# Patient Record
Sex: FEMALE | Race: WHITE | NOT HISPANIC OR LATINO | Employment: OTHER | URBAN - METROPOLITAN AREA
[De-identification: names, ages, dates, MRNs, and addresses within clinical notes are randomized per-mention and may not be internally consistent; named-entity substitution may affect disease eponyms.]

---

## 2017-04-13 ENCOUNTER — HOSPITAL ENCOUNTER (OUTPATIENT)
Facility: HOSPITAL | Age: 82
Setting detail: OBSERVATION
LOS: 1 days | Discharge: HOME | End: 2017-04-15
Attending: EMERGENCY MEDICINE | Admitting: HOSPITALIST

## 2017-04-13 ENCOUNTER — APPOINTMENT (OUTPATIENT)
Dept: CT IMAGING | Facility: HOSPITAL | Age: 82
End: 2017-04-13

## 2017-04-13 ENCOUNTER — APPOINTMENT (OUTPATIENT)
Dept: GENERAL RADIOLOGY | Facility: HOSPITAL | Age: 82
End: 2017-04-13

## 2017-04-13 DIAGNOSIS — S32.000A COMPRESSION FRACTURE OF LUMBAR VERTEBRA, CLOSED, INITIAL ENCOUNTER (HCC): ICD-10-CM

## 2017-04-13 DIAGNOSIS — M54.50 ACUTE MIDLINE LOW BACK PAIN WITHOUT SCIATICA: Primary | ICD-10-CM

## 2017-04-13 DIAGNOSIS — R10.9 ABDOMINAL CRAMPING: ICD-10-CM

## 2017-04-13 LAB
ALBUMIN SERPL-MCNC: 3.9 G/DL (ref 3.5–5.2)
ALBUMIN/GLOB SERPL: 1.1 G/DL
ALP SERPL-CCNC: 75 U/L (ref 39–117)
ALT SERPL W P-5'-P-CCNC: 18 U/L (ref 1–33)
ANION GAP SERPL CALCULATED.3IONS-SCNC: 9.6 MMOL/L
AST SERPL-CCNC: 17 U/L (ref 1–32)
BASOPHILS # BLD AUTO: 0.01 10*3/MM3 (ref 0–0.2)
BASOPHILS NFR BLD AUTO: 0.2 % (ref 0–1.5)
BILIRUB SERPL-MCNC: 0.4 MG/DL (ref 0.1–1.2)
BILIRUB UR QL STRIP: NEGATIVE
BUN BLD-MCNC: 28 MG/DL (ref 8–23)
BUN/CREAT SERPL: 43.8 (ref 7–25)
CALCIUM SPEC-SCNC: 9.9 MG/DL (ref 8.2–9.6)
CHLORIDE SERPL-SCNC: 97 MMOL/L (ref 98–107)
CLARITY UR: CLEAR
CO2 SERPL-SCNC: 30.4 MMOL/L (ref 22–29)
COLOR UR: YELLOW
CREAT BLD-MCNC: 0.64 MG/DL (ref 0.57–1)
DEPRECATED RDW RBC AUTO: 46 FL (ref 37–54)
EOSINOPHIL # BLD AUTO: 0.16 10*3/MM3 (ref 0–0.7)
EOSINOPHIL NFR BLD AUTO: 3.1 % (ref 0.3–6.2)
ERYTHROCYTE [DISTWIDTH] IN BLOOD BY AUTOMATED COUNT: 13.7 % (ref 11.7–13)
GFR SERPL CREATININE-BSD FRML MDRD: 86 ML/MIN/1.73
GLOBULIN UR ELPH-MCNC: 3.7 GM/DL
GLUCOSE BLD-MCNC: 110 MG/DL (ref 65–99)
GLUCOSE UR STRIP-MCNC: NEGATIVE MG/DL
HCT VFR BLD AUTO: 36.2 % (ref 35.6–45.5)
HGB BLD-MCNC: 11.5 G/DL (ref 11.9–15.5)
HGB UR QL STRIP.AUTO: NEGATIVE
IMM GRANULOCYTES # BLD: 0.02 10*3/MM3 (ref 0–0.03)
IMM GRANULOCYTES NFR BLD: 0.4 % (ref 0–0.5)
KETONES UR QL STRIP: NEGATIVE
LEUKOCYTE ESTERASE UR QL STRIP.AUTO: NEGATIVE
LIPASE SERPL-CCNC: 23 U/L (ref 13–60)
LYMPHOCYTES # BLD AUTO: 0.8 10*3/MM3 (ref 0.9–4.8)
LYMPHOCYTES NFR BLD AUTO: 15.6 % (ref 19.6–45.3)
MCH RBC QN AUTO: 29.4 PG (ref 26.9–32)
MCHC RBC AUTO-ENTMCNC: 31.8 G/DL (ref 32.4–36.3)
MCV RBC AUTO: 92.6 FL (ref 80.5–98.2)
MONOCYTES # BLD AUTO: 0.45 10*3/MM3 (ref 0.2–1.2)
MONOCYTES NFR BLD AUTO: 8.8 % (ref 5–12)
NEUTROPHILS # BLD AUTO: 3.69 10*3/MM3 (ref 1.9–8.1)
NEUTROPHILS NFR BLD AUTO: 71.9 % (ref 42.7–76)
NITRITE UR QL STRIP: NEGATIVE
PH UR STRIP.AUTO: 6.5 [PH] (ref 5–8)
PLATELET # BLD AUTO: 154 10*3/MM3 (ref 140–500)
PMV BLD AUTO: 11.6 FL (ref 6–12)
POTASSIUM BLD-SCNC: 5.3 MMOL/L (ref 3.5–5.2)
PROT SERPL-MCNC: 7.6 G/DL (ref 6–8.5)
PROT UR QL STRIP: NEGATIVE
RBC # BLD AUTO: 3.91 10*6/MM3 (ref 3.9–5.2)
SODIUM BLD-SCNC: 137 MMOL/L (ref 136–145)
SP GR UR STRIP: 1.02 (ref 1–1.03)
UROBILINOGEN UR QL STRIP: NORMAL
WBC NRBC COR # BLD: 5.13 10*3/MM3 (ref 4.5–10.7)

## 2017-04-13 PROCEDURE — 74177 CT ABD & PELVIS W/CONTRAST: CPT

## 2017-04-13 PROCEDURE — 99285 EMERGENCY DEPT VISIT HI MDM: CPT

## 2017-04-13 PROCEDURE — 80162 ASSAY OF DIGOXIN TOTAL: CPT | Performed by: EMERGENCY MEDICINE

## 2017-04-13 PROCEDURE — 72110 X-RAY EXAM L-2 SPINE 4/>VWS: CPT

## 2017-04-13 PROCEDURE — 83690 ASSAY OF LIPASE: CPT | Performed by: EMERGENCY MEDICINE

## 2017-04-13 PROCEDURE — 81003 URINALYSIS AUTO W/O SCOPE: CPT | Performed by: EMERGENCY MEDICINE

## 2017-04-13 PROCEDURE — 0 IOPAMIDOL 61 % SOLUTION: Performed by: EMERGENCY MEDICINE

## 2017-04-13 PROCEDURE — 85025 COMPLETE CBC W/AUTO DIFF WBC: CPT | Performed by: EMERGENCY MEDICINE

## 2017-04-13 PROCEDURE — 80053 COMPREHEN METABOLIC PANEL: CPT | Performed by: EMERGENCY MEDICINE

## 2017-04-13 RX ORDER — MORPHINE SULFATE 2 MG/ML
2 INJECTION, SOLUTION INTRAMUSCULAR; INTRAVENOUS ONCE
Status: COMPLETED | OUTPATIENT
Start: 2017-04-13 | End: 2017-04-14

## 2017-04-13 RX ORDER — ONDANSETRON 2 MG/ML
4 INJECTION INTRAMUSCULAR; INTRAVENOUS ONCE
Status: COMPLETED | OUTPATIENT
Start: 2017-04-13 | End: 2017-04-14

## 2017-04-13 RX ORDER — SODIUM CHLORIDE 0.9 % (FLUSH) 0.9 %
10 SYRINGE (ML) INJECTION AS NEEDED
Status: DISCONTINUED | OUTPATIENT
Start: 2017-04-13 | End: 2017-04-15 | Stop reason: HOSPADM

## 2017-04-13 RX ADMIN — IOPAMIDOL 85 ML: 612 INJECTION, SOLUTION INTRAVENOUS at 23:47

## 2017-04-14 PROBLEM — K52.9 ENTERITIS: Status: ACTIVE | Noted: 2017-04-14

## 2017-04-14 PROBLEM — S32.000A COMPRESSION FRACTURE OF LUMBAR VERTEBRA (HCC): Status: ACTIVE | Noted: 2017-04-14

## 2017-04-14 PROBLEM — M54.50 ACUTE MIDLINE LOW BACK PAIN WITHOUT SCIATICA: Status: ACTIVE | Noted: 2017-04-14

## 2017-04-14 LAB
DIGOXIN SERPL-MCNC: 0.5 NG/ML (ref 0.6–1.2)
POTASSIUM BLD-SCNC: 4.3 MMOL/L (ref 3.5–5.2)

## 2017-04-14 PROCEDURE — G8978 MOBILITY CURRENT STATUS: HCPCS

## 2017-04-14 PROCEDURE — G0378 HOSPITAL OBSERVATION PER HR: HCPCS

## 2017-04-14 PROCEDURE — 96375 TX/PRO/DX INJ NEW DRUG ADDON: CPT

## 2017-04-14 PROCEDURE — 25010000002 MORPHINE PER 10 MG: Performed by: EMERGENCY MEDICINE

## 2017-04-14 PROCEDURE — G8979 MOBILITY GOAL STATUS: HCPCS

## 2017-04-14 PROCEDURE — 84132 ASSAY OF SERUM POTASSIUM: CPT | Performed by: INTERNAL MEDICINE

## 2017-04-14 PROCEDURE — 96374 THER/PROPH/DIAG INJ IV PUSH: CPT

## 2017-04-14 PROCEDURE — G8980 MOBILITY D/C STATUS: HCPCS

## 2017-04-14 PROCEDURE — 97161 PT EVAL LOW COMPLEX 20 MIN: CPT

## 2017-04-14 PROCEDURE — 25010000002 ONDANSETRON PER 1 MG: Performed by: EMERGENCY MEDICINE

## 2017-04-14 RX ORDER — MAGNESIUM HYDROXIDE/ALUMINUM HYDROXICE/SIMETHICONE 120; 1200; 1200 MG/30ML; MG/30ML; MG/30ML
30 SUSPENSION ORAL DAILY PRN
Status: DISCONTINUED | OUTPATIENT
Start: 2017-04-14 | End: 2017-04-15 | Stop reason: HOSPADM

## 2017-04-14 RX ORDER — POTASSIUM CHLORIDE 750 MG/1
20 CAPSULE, EXTENDED RELEASE ORAL DAILY
Status: DISCONTINUED | OUTPATIENT
Start: 2017-04-14 | End: 2017-04-15 | Stop reason: HOSPADM

## 2017-04-14 RX ORDER — LIDOCAINE 50 MG/G
1 PATCH TOPICAL
Status: DISCONTINUED | OUTPATIENT
Start: 2017-04-14 | End: 2017-04-15 | Stop reason: HOSPADM

## 2017-04-14 RX ORDER — ISOSORBIDE MONONITRATE 30 MG/1
30 TABLET, EXTENDED RELEASE ORAL DAILY
Status: DISCONTINUED | OUTPATIENT
Start: 2017-04-14 | End: 2017-04-15 | Stop reason: HOSPADM

## 2017-04-14 RX ORDER — SODIUM CHLORIDE 0.9 % (FLUSH) 0.9 %
1-10 SYRINGE (ML) INJECTION AS NEEDED
Status: DISCONTINUED | OUTPATIENT
Start: 2017-04-14 | End: 2017-04-15 | Stop reason: HOSPADM

## 2017-04-14 RX ORDER — CHOLECALCIFEROL (VITAMIN D3) 125 MCG
3000 CAPSULE ORAL
COMMUNITY
End: 2018-01-01 | Stop reason: SDUPTHER

## 2017-04-14 RX ORDER — PANTOPRAZOLE SODIUM 40 MG/1
40 TABLET, DELAYED RELEASE ORAL
Status: DISCONTINUED | OUTPATIENT
Start: 2017-04-14 | End: 2017-04-15 | Stop reason: HOSPADM

## 2017-04-14 RX ORDER — LISINOPRIL 20 MG/1
20 TABLET ORAL DAILY
Status: DISCONTINUED | OUTPATIENT
Start: 2017-04-14 | End: 2017-04-15 | Stop reason: HOSPADM

## 2017-04-14 RX ORDER — TRAMADOL HYDROCHLORIDE 50 MG/1
50 TABLET ORAL EVERY 6 HOURS PRN
Status: DISCONTINUED | OUTPATIENT
Start: 2017-04-14 | End: 2017-04-15 | Stop reason: HOSPADM

## 2017-04-14 RX ORDER — FERROUS SULFATE 325(65) MG
325 TABLET ORAL
Status: DISCONTINUED | OUTPATIENT
Start: 2017-04-14 | End: 2017-04-15 | Stop reason: HOSPADM

## 2017-04-14 RX ORDER — ONDANSETRON 4 MG/1
4 TABLET, ORALLY DISINTEGRATING ORAL EVERY 6 HOURS PRN
Status: DISCONTINUED | OUTPATIENT
Start: 2017-04-14 | End: 2017-04-15 | Stop reason: HOSPADM

## 2017-04-14 RX ORDER — POLYETHYLENE GLYCOL 3350 17 G/17G
17 POWDER, FOR SOLUTION ORAL DAILY
Status: DISCONTINUED | OUTPATIENT
Start: 2017-04-14 | End: 2017-04-15 | Stop reason: HOSPADM

## 2017-04-14 RX ORDER — AMLODIPINE BESYLATE 2.5 MG/1
2.5 TABLET ORAL DAILY
Status: DISCONTINUED | OUTPATIENT
Start: 2017-04-14 | End: 2017-04-15 | Stop reason: HOSPADM

## 2017-04-14 RX ORDER — ONDANSETRON 4 MG/1
4 TABLET, FILM COATED ORAL EVERY 6 HOURS PRN
Status: DISCONTINUED | OUTPATIENT
Start: 2017-04-14 | End: 2017-04-15 | Stop reason: HOSPADM

## 2017-04-14 RX ORDER — TRAZODONE HYDROCHLORIDE 50 MG/1
50 TABLET ORAL NIGHTLY PRN
Status: DISCONTINUED | OUTPATIENT
Start: 2017-04-14 | End: 2017-04-15 | Stop reason: HOSPADM

## 2017-04-14 RX ORDER — ONDANSETRON 2 MG/ML
4 INJECTION INTRAMUSCULAR; INTRAVENOUS EVERY 6 HOURS PRN
Status: DISCONTINUED | OUTPATIENT
Start: 2017-04-14 | End: 2017-04-15 | Stop reason: HOSPADM

## 2017-04-14 RX ORDER — BISACODYL 10 MG
10 SUPPOSITORY, RECTAL RECTAL AS NEEDED
Status: DISCONTINUED | OUTPATIENT
Start: 2017-04-14 | End: 2017-04-15 | Stop reason: HOSPADM

## 2017-04-14 RX ORDER — ACETAMINOPHEN 325 MG/1
650 TABLET ORAL EVERY 6 HOURS PRN
Status: DISCONTINUED | OUTPATIENT
Start: 2017-04-14 | End: 2017-04-15 | Stop reason: HOSPADM

## 2017-04-14 RX ORDER — OXYCODONE HYDROCHLORIDE AND ACETAMINOPHEN 5; 325 MG/1; MG/1
1 TABLET ORAL EVERY 4 HOURS PRN
Status: DISCONTINUED | OUTPATIENT
Start: 2017-04-14 | End: 2017-04-15 | Stop reason: HOSPADM

## 2017-04-14 RX ORDER — CHOLECALCIFEROL (VITAMIN D3) 125 MCG
3000 CAPSULE ORAL
Status: DISCONTINUED | OUTPATIENT
Start: 2017-04-14 | End: 2017-04-15 | Stop reason: HOSPADM

## 2017-04-14 RX ORDER — SERTRALINE HYDROCHLORIDE 25 MG/1
25 TABLET, FILM COATED ORAL DAILY
Status: DISCONTINUED | OUTPATIENT
Start: 2017-04-14 | End: 2017-04-15 | Stop reason: HOSPADM

## 2017-04-14 RX ADMIN — FERROUS SULFATE TAB 325 MG (65 MG ELEMENTAL FE) 325 MG: 325 (65 FE) TAB at 10:59

## 2017-04-14 RX ADMIN — ISOSORBIDE MONONITRATE 30 MG: 30 TABLET ORAL at 10:59

## 2017-04-14 RX ADMIN — PANTOPRAZOLE SODIUM 40 MG: 40 TABLET, DELAYED RELEASE ORAL at 11:07

## 2017-04-14 RX ADMIN — RIVAROXABAN 15 MG: 15 TABLET, FILM COATED ORAL at 10:59

## 2017-04-14 RX ADMIN — SERTRALINE 25 MG: 25 TABLET, FILM COATED ORAL at 10:59

## 2017-04-14 RX ADMIN — LACTASE TAB 3000 UNIT 3000 UNITS: 3000 TAB at 16:12

## 2017-04-14 RX ADMIN — LISINOPRIL 20 MG: 20 TABLET ORAL at 10:59

## 2017-04-14 RX ADMIN — TRAMADOL HYDROCHLORIDE 50 MG: 50 TABLET, COATED ORAL at 13:07

## 2017-04-14 RX ADMIN — MORPHINE SULFATE 2 MG: 2 INJECTION, SOLUTION INTRAMUSCULAR; INTRAVENOUS at 00:27

## 2017-04-14 RX ADMIN — LACTASE TAB 3000 UNIT 3000 UNITS: 3000 TAB at 11:01

## 2017-04-14 RX ADMIN — POTASSIUM CHLORIDE 20 MEQ: 750 CAPSULE, EXTENDED RELEASE ORAL at 11:07

## 2017-04-14 RX ADMIN — OXYCODONE HYDROCHLORIDE AND ACETAMINOPHEN 1 TABLET: 5; 325 TABLET ORAL at 20:44

## 2017-04-14 RX ADMIN — ONDANSETRON 4 MG: 2 INJECTION INTRAMUSCULAR; INTRAVENOUS at 00:27

## 2017-04-14 RX ADMIN — LIDOCAINE 1 PATCH: 50 PATCH CUTANEOUS at 10:59

## 2017-04-14 RX ADMIN — AMLODIPINE BESYLATE 2.5 MG: 2.5 TABLET ORAL at 10:59

## 2017-04-14 NOTE — PLAN OF CARE
Problem: Patient Care Overview (Adult)  Goal: Plan of Care Review    04/14/17 2247   Coping/Psychosocial Response Interventions   Plan Of Care Reviewed With patient   Outcome Evaluation   Outcome Summary/Follow up Plan Pt reports near baseline status. Ambulated 200' using rolling walker w/ stand by assist. No inpatient PT needs, recommend return to assisted living w/ home PT. DC PT, pt aware of activity recommendations while inpatient.

## 2017-04-14 NOTE — THERAPY DISCHARGE NOTE
Acute Care - Physical Therapy Initial Eval/Discharge  [facility]     Patient Name: Isadora Powell  : 1921  MRN: [id number]  Today's Date: 2017   Onset of Illness/Injury or Date of Surgery Date: 17            Admit Date: 2017    Visit Dx:    ICD-10-CM ICD-9-CM   1. Acute midline low back pain without sciatica M54.5 724.2   2. Compression fracture of lumbar vertebra, closed, initial encounter S32.000A 805.4   3. Abdominal cramping R10.9 789.00     Patient Active Problem List   Diagnosis   • HTN (hypertension)   • CVA (cerebral infarction)   • GERD (gastroesophageal reflux disease)   • GERD (gastroesophageal reflux disease)   • Atrial fibrillation   • Depression   • Acute midline low back pain without sciatica   • Enteritis   • Compression fracture of lumbar vertebra     Past Medical History:   Diagnosis Date   • Atrial fibrillation    • CVA (cerebral infarction)    • Depression    • GERD (gastroesophageal reflux disease)    • Hypertension    • Hyponatremia    • Skin cancer    • TIA (transient ischemic attack)      Past Surgical History:   Procedure Laterality Date   • CATARACT EXTRACTION, BILATERAL  unknown   • CHOLECYSTECTOMY  unknown          PT ASSESSMENT (last 72 hours)      PT Evaluation       17 1340 17 0531    Rehab Evaluation    Document Type evaluation  -AR     Subjective Information agree to therapy  -AR     Patient Effort, Rehab Treatment good  -AR     Symptoms Noted During/After Treatment none  -AR     General Information    Patient Profile Review yes  -AR     Onset of Illness/Injury or Date of Surgery Date 17  -AR     Precautions/Limitations fall precautions  -AR     Prior Level of Function min assist:   assisted living, walks to dining rainey  -AR     Equipment Currently Used at Home --   rollator  -AR     Barriers to Rehab none identified  -AR     Living Environment    Lives With  facility resident  -DA    Living Arrangements  extended care facility    [facility]  -DA    Home Accessibility  other (see comments)   LTC facility accommodations  -DA    Stair Railings at Home  inside, present at both sides;outside, present at both sides  -DA    Type of Financial/Environmental Concern  none  -DA    Transportation Available  car  -DA    Living Environment Comment assisted facility  -AR     Clinical Impression    Patient/Family Goals Statement DC back to assisted living  -AR     Criteria for Skilled Therapeutic Interventions Met no  -AR     Pain Assessment    Pain Assessment 0-10  -AR     Pain Score 4  -AR     Pain Type Surgical pain  -AR     Pain Location Back  -AR     Pain Intervention(s) Repositioned  -AR     Cognitive Assessment/Intervention    Current Cognitive/Communication Assessment functional  -AR     Orientation Status oriented x 4  -AR     Follows Commands/Answers Questions 100% of the time  -AR     ROM (Range of Motion)    General ROM --   B LE WFL  -AR     MMT (Manual Muscle Testing)    General MMT Assessment --   BLE 4/5  -AR     Bed Mobility, Assessment/Treatment    Bed Mob, Supine to Sit, [place] not tested  -AR     Bed Mob, Sit to Supine, [place] not tested  -AR     Transfer Assessment/Treatment    Transfers, Sit-Stand [place] stand by assist  -AR     Transfers, Stand-Sit [place] stand by assist  -AR     Transfers, Sit-Stand-Sit, Assist Device rolling walker  -AR     Gait Assessment/Treatment    Gait, [place] Level stand by assist  -AR     Gait, Assistive Device rolling walker  -AR     Gait, Distance (Feet) 200  -AR     Gait, Gait Pattern Analysis swing-through gait  -AR     Gait, Gait Deviations loretta decreased;decreased heel strike  -AR     Gait, Safety Issues step length decreased  -AR     Gait, Comment pt reports at baseline status  -AR     Positioning and Restraints    Pre-Treatment Position sitting in chair/recliner  -AR     Post Treatment Position chair  -AR     In Chair sitting;encouraged to call for assist;call  light within reach  -AR       04/14/17 0529       General Information    Equipment Currently Used at Home walker, rolling  -DA       User Key  (r) = Recorded By, (t) = Taken By, (c) = Cosigned By    Initials Name Provider Type    FOSTER Callahan, RN Registered Nurse    TYLER Clark, PT Physical Therapist              PT Recommendation and Plan  Anticipated Discharge Disposition: assisted living, home with home health  PT Frequency: evaluation only  Plan of Care Review  Plan Of Care Reviewed With: patient  Outcome Summary/Follow up Plan: Pt reports near baseline status.  Ambulated 200' using rolling walker w/ stand by assist.  No inpatient PT needs, recommend return to assisted living w/ home PT.  DC PT, pt aware of activity recommendations while inpatient.               Outcome Measures       04/14/17 1300          How much help from another person do you currently need...    Turning from your back to your side while in flat bed without using bedrails? 4  -AR      Moving from lying on back to sitting on the side of a flat bed without bedrails? 4  -AR      Moving to and from a bed to a chair (including a wheelchair)? 4  -AR      Standing up from a chair using your arms (e.g., wheelchair, bedside chair)? 4  -AR      Climbing 3-5 steps with a railing? 3  -AR      To walk in hospital room? 3  -AR      AM-PAC 6 Clicks Score 22  -AR      Functional Assessment    Outcome Measure Options AM-PAC 6 Clicks Basic Mobility (PT)  -AR        User Key  (r) = Recorded By, (t) = Taken By, (c) = Cosigned By    Initials Name Provider Type    TYLER Clark PT Physical Therapist           Time Calculation:         PT Charges       04/14/17 1350          Time Calculation    Start Time 1323  -AR      Stop Time 1350  -AR      Time Calculation (min) 27 min  -AR      PT Received On 04/14/17  -AR        User Key  (r) = Recorded By, (t) = Taken By, (c) = Cosigned By    Initials Name Provider Type    TYLER Clark PT  Physical Therapist          Therapy Charges for Today     Code Description Service Date Service Provider Modifiers Qty    75828051418 HC PT EVAL LOW COMPLEXITY 2 4/14/2017 Corinne Clark, PT GP 1    58831093885 HC PT MOBILITY CURRENT 4/14/2017 Corinne Clark PT GP, CI 1    51383614840 HC PT MOBILITY PROJECTED 4/14/2017 Corinne Clark PT GP, CI 1    99229951560 HC PT MOBILITY DISCHARGE 4/14/2017 Corinne Clark PT GP, CI 1          PT G-Codes  Outcome Measure Options: AM-PAC 6 Clicks Basic Mobility (PT)  Functional Limitation: Mobility: Walking and moving around  Mobility: Walking and Moving Around Current Status (): At least 1 percent but less than 20 percent impaired, limited or restricted  Mobility: Walking and Moving Around Goal Status (): At least 1 percent but less than 20 percent impaired, limited or restricted  Mobility: Walking and Moving Around Discharge Status (): At least 1 percent but less than 20 percent impaired, limited or restricted    PT Discharge Summary  Anticipated Discharge Disposition: assisted living, home with home health  Reason for Discharge: At baseline function  Discharge Destination: Assisted Living Facility    Corinne Clark PT  4/14/2017

## 2017-04-14 NOTE — H&P
[facility] Admission H&P    Patient Care Team:  JANETTE Kaur as PCP - General (Internal Medicine)    Chief complaint: Upper abdominal cramping, low back pain after a fall one week ago    History of Present Illness    This is a 89-year-old white female with a history of atrial fibrillation, prior stroke, hypertension, GERD, and chronic lumbar compression fractures that presented to the hospital last evening with complaints of upper abdominal cramping and nausea but no vomiting.  It was also discovered in the emergency room that she was having low back pain.  She experienced a fall one week ago.  She initially did not have pain after its onset a few days ago has become slightly worse.  It is in the exact location of her previous compression fractures.  Today she was able to ambulate to the bathroom with assistance and did not have any pain.  She only has pain when you press on her low back.  Her nausea has improved.  She has been able to eat breakfast this morning.  She denies any diarrhea, fevers, chills.  No chest pain.  She feels a little short of breath at times but this appears to be transient in nature.    Past Medical History:   Diagnosis Date   • Atrial fibrillation    • CVA (cerebral infarction)    • Depression    • GERD (gastroesophageal reflux disease)    • Hypertension    • Hyponatremia    • Skin cancer    • TIA (transient ischemic attack)      Past Surgical History:   Procedure Laterality Date   • CATARACT EXTRACTION, BILATERAL  unknown   • CHOLECYSTECTOMY  unknown     History reviewed. No pertinent family history.  Social History   Substance Use Topics   • Smoking status: Former Smoker     Packs/day: 0.25     Years: 15.00     Types: Cigarettes     Start date: 1940     Quit date: 1955   • Smokeless tobacco: Never Used   • Alcohol use No     Prescriptions Prior to Admission   Medication Sig Dispense Refill Last Dose   • acetaminophen (TYLENOL) 325 MG tablet Take 650 mg by mouth Every 6 (Six) Hours  As Needed for mild pain (1-3).   Not Taking   • aluminum-magnesium hydroxide-simethicone (MAALOX/MYLANTA) 200-200-20 MG/5ML suspension Take 30 mL by mouth daily as needed for indigestion or heartburn.   Taking   • amLODIPine (NORVASC) 2.5 MG tablet Take 2.5 mg by mouth daily.   Taking   • bisacodyl (BISACODYL LAXATIVE) 10 MG suppository Insert 10 mg into the rectum As Needed for constipation.      • Cetirizine HCl (ZYRTEC ALLERGY) 10 MG capsule Take  by mouth daily.   Taking   • Cholecalciferol (VITAMIN D-3) 1000 UNITS capsule Take 1,000 Units by mouth Daily.   Taking   • digoxin (LANOXIN) 125 MCG tablet Take 125 mcg by mouth Every Other Day. At 11 am, Hold for heart rate < 60   Taking   • ferrous sulfate 325 (65 FE) MG tablet Take 325 mg by mouth daily with breakfast.   Taking   • isosorbide mononitrate (IMDUR) 30 MG 24 hr tablet Take 30 mg by mouth Daily.      • lactase (LACTAID) 3000 UNITS tablet Take 3,000 Units by mouth 3 (Three) Times a Day With Meals.      • lisinopril (PRINIVIL,ZESTRIL) 20 MG tablet Take 20 mg by mouth daily.   Taking   • Loperamide HCl (IMODIUM A-D PO) Take 2 mg by mouth 2 (Two) Times a Day.      • Multiple Vitamins-Minerals (CERTAVITE SENIOR/ANTIOXIDANT PO) Take  by mouth Daily.      • nitroglycerin (NITROSTAT) 0.4 MG SL tablet Place 0.4 mg under the tongue Every 5 (Five) Minutes As Needed for chest pain. Take no more than 3 doses in 15 minutes.      • omeprazole (priLOSEC) 20 MG capsule Take 20 mg by mouth Daily.      • ondansetron (ZOFRAN) 4 MG tablet Take 4 mg by mouth Every 8 (Eight) Hours As Needed for nausea or vomiting.      • oyster shell calcium 500 MG tablet tablet Take  by mouth 2 (two) times a day.   Taking   • polyethylene glycol (MIRALAX) packet Take 17 g by mouth Daily.   Taking   • potassium chloride (K-DUR,KLOR-CON) 20 MEQ CR tablet Take 20 mEq by mouth 2 (Two) Times a Day.      • rivaroxaban (XARELTO) 15 MG tablet Take 15 mg by mouth daily.   Taking   • sertraline  (ZOLOFT) 25 MG tablet Take 25 mg by mouth daily.   Taking   • traMADol (ULTRAM) 50 MG tablet Take 50 mg by mouth Every 6 (Six) Hours As Needed for moderate pain (4-6).   Taking   • traZODone (DESYREL) 50 MG tablet Take 50 mg by mouth At Night As Needed for sleep.        Allergies:  Miconazole and Monistat [tioconazole]    Review of Systems   Constitutional: Negative for chills and fever.   HENT: Negative for congestion and sore throat.    Eyes: Negative for visual disturbance.   Respiratory: Negative for cough, chest tightness, shortness of breath and wheezing.    Cardiovascular: Negative for chest pain, palpitations and leg swelling.   Gastrointestinal: Positive for abdominal pain and nausea. Negative for abdominal distention, diarrhea and vomiting.   Endocrine: Negative for polydipsia and polyuria.   Genitourinary: Negative for difficulty urinating, dysuria, frequency and urgency.   Musculoskeletal: Positive for back pain. Negative for arthralgias and myalgias.   Skin: Negative for color change and rash.   Neurological: Positive for weakness. Negative for dizziness and light-headedness.        PHYSICAL EXAM    Vital Signs  tMax 24 hrs:  Temp (24hrs), Av.6 °F (36.4 °C), Min:97.5 °F (36.4 °C), Max:97.8 °F (36.6 °C)    Vitals Ranges:  Temp:  [97.5 °F (36.4 °C)-97.8 °F (36.6 °C)] 97.6 °F (36.4 °C)  Heart Rate:  [59-75] 75  Resp:  [12-22] 12  BP: (120-157)/(54-97) 132/65    Physical Exam   Constitutional: She is oriented to person, place, and time. She appears well-developed and well-nourished. No distress.   Frail, elderly appearing white female.  Appears her stated age.   HENT:   Head: Normocephalic and atraumatic.   Eyes: EOM are normal. Pupils are equal, round, and reactive to light.   Neck: Neck supple. No tracheal deviation present.   Cardiovascular: Normal rate.  Exam reveals no gallop.    No murmur heard.  Pulmonary/Chest: Effort normal. No respiratory distress. She has no wheezes.   Abdominal: Soft. Bowel  sounds are normal. She exhibits no distension. There is no tenderness.   Musculoskeletal: She exhibits tenderness. She exhibits no edema.   She does have mild to moderate point tenderness over the lumbar spine.  Negative straight leg raise bilaterally.   Neurological: She is alert and oriented to person, place, and time. No cranial nerve deficit.   Skin: Skin is warm and dry.   Nursing note and vitals reviewed.      Results Review:    Results from last 7 days  Lab Units 04/13/17  2233   WBC 10*3/mm3 5.13   HEMOGLOBIN g/dL 11.5*   HEMATOCRIT % 36.2   PLATELETS 10*3/mm3 154       Results from last 7 days  Lab Units 04/13/17  2233   SODIUM mmol/L 137   POTASSIUM mmol/L 5.3*   CHLORIDE mmol/L 97*   TOTAL CO2 mmol/L 30.4*   BUN mg/dL 28*   CREATININE mg/dL 0.64   CALCIUM mg/dL 9.9*   BILIRUBIN mg/dL 0.4   ALK PHOS U/L 75   ALT (SGPT) U/L 18   AST (SGOT) U/L 17   GLUCOSE mg/dL 110*     CT scan of the abdomen and pelvis:  1.  Enteritis/enterocolitis is suspected, no obstruction, perforation or  abscess.   2.  Hepatomegaly measuring 21.1 cm. Hepatic cysts measuring up to 2.2  cm.  3.  Cardiomegaly and severe atherosclerotic disease.    Lumbar spine x-ray:  Interval development of chronic osteoporotic compression fractures,  osteoporotic changes are present. In the midst of these extensive  findings, a subacute fracture cannot be excluded. In case of focal  tenderness MRI may be performed.     I reviewed the patient's new clinical results.  I reviewed the patient's new imaging results and agree with the interpretation.      Principal Problem:    Acute midline low back pain without sciatica  Active Problems:    HTN (hypertension)    GERD (gastroesophageal reflux disease)    Atrial fibrillation    Enteritis    Compression fracture of lumbar vertebra   Mild hyperkalemia    Assessment & Plan    The patient has been admitted for her low back pain as well as her upper abdominal pain.  CT scan of the abdomen and pelvis indicates  that she likely has an enteritis.  We'll treat her with supportive care for this.  She has a history of chronic compression fractures of her lumbar spine that occurred 20 years ago.  She had a fall one week ago and has had pain in this area once again over the past few days.  Today she was able to ambulate with assistance to the bathroom and states she had no pain with that.  She is mildly point tender over her lumbar spine.  I discussed with her what our options would be including MRI and neurosurgery evaluation versus conservative management with pain medications, Lidoderm patch, and physical therapy.  She has opted for the latter.  She states she would not want to undergo an MRI or any procedure.  She states that when she was taken off of her Xarelto in the past for a prior procedure she had a stroke within a couple of days.  Given that she was able to ambulate this morning without significant pain I think a conservative plan is best for this 89-year-old lady.  I will ask PT to evaluate her.  Will ask care coordination to see her for help with discharge planning.  I will recheck a potassium given mild elevation on presentation.  We'll see how she progresses with further plans based on her clinical course.    I discussed the patients findings and my recommendations with patient and nursing staff    Rolando Gorman MD  04/14/17  8:49 AM

## 2017-04-14 NOTE — PLAN OF CARE
Problem: Patient Care Overview (Adult)  Goal: Plan of Care Review  Outcome: Ongoing (interventions implemented as appropriate)    04/14/17 1951   Coping/Psychosocial Response Interventions   Plan Of Care Reviewed With patient   Patient Care Overview   Progress improving   Outcome Evaluation   Outcome Summary/Follow up Plan Pt states her pain is improved. Ultram given x1 with relief. PT worked with pt, they say she is at baseline and recommend she return to assisted living and signed off. Will monitor.         Problem: Fall Risk (Adult)  Goal: Absence of Falls  Outcome: Ongoing (interventions implemented as appropriate)    Problem: Pain, Acute (Adult)  Goal: Identify Related Risk Factors and Signs and Symptoms  Outcome: Outcome(s) achieved Date Met:  04/14/17  Goal: Acceptable Pain Control/Comfort Level  Outcome: Ongoing (interventions implemented as appropriate)

## 2017-04-14 NOTE — PLAN OF CARE
Problem: Patient Care Overview (Adult)  Goal: Plan of Care Review  Outcome: Ongoing (interventions implemented as appropriate)    04/14/17 0552   Coping/Psychosocial Response Interventions   Plan Of Care Reviewed With patient   Patient Care Overview   Progress no change   Outcome Evaluation   Outcome Summary/Follow up Plan pt admitted to unit from ER, pt welcomed and oriented to unit, questions/comments/concerns addressed. Pt denies c/o pain/discomfort at time of admission. Pt requested O2 per NC be taken off, pt sats 95% and no O2 at home, pt currently on RA. MD notified of admit for orders. Continue to monitor and tx per POC and MD orders       Goal: Adult Individualization and Mutuality  Outcome: Ongoing (interventions implemented as appropriate)  Goal: Discharge Needs Assessment  Outcome: Ongoing (interventions implemented as appropriate)    Problem: Fall Risk (Adult)  Goal: Identify Related Risk Factors and Signs and Symptoms  Outcome: Outcome(s) achieved Date Met:  04/14/17  Goal: Absence of Falls  Outcome: Ongoing (interventions implemented as appropriate)    Problem: Pain, Acute (Adult)  Goal: Identify Related Risk Factors and Signs and Symptoms  Outcome: Ongoing (interventions implemented as appropriate)  Goal: Acceptable Pain Control/Comfort Level  Outcome: Ongoing (interventions implemented as appropriate)

## 2017-04-14 NOTE — ED PROVIDER NOTES
" EMERGENCY DEPARTMENT ENCOUNTER    CHIEF COMPLAINT  Chief Complaint: back pain, abd pain  History given by: patient  History limited by: nothing  Room Number: 22/22  PMD: JANETTE Kaur      HPI:  Pt is a 89 y.o. female who presents complaining of upper abd \"spasms\" and lumbar back pain, which began earlier tonight. Pt states that her pain is worse with ambulation and movement. Pt states that she fell one week ago but did not have any pain initially. Pt states that she has known lumbar compression fractures. Pt also complains of diarrhea one week ago (now resolved) but denies N/V, numbness or tingling. Pt states that she has been able to ambulate with a rollator since that time. Pt is on Xarelto for a-fib.     Duration:  One day  Onset: gradual  Timing: constant  Location: lumbar spine  Radiation: none  Quality: \"pain\"  Intensity/Severity: moderate  Progression: worsening  Associated Symptoms: upper abd \"spasms\", diarrhea one week ago  Aggravating Factors: ambulation, movement  Alleviating Factors: none  Previous Episodes: Pt states that she has known L-Spine compression fractures.  Treatment before arrival: none    PAST MEDICAL HISTORY  Active Ambulatory Problems     Diagnosis Date Noted   • Uncontrolled hypertension 05/27/2016   • CVA (cerebral infarction) 05/28/2016   • GERD (gastroesophageal reflux disease) 05/28/2016   • GERD (gastroesophageal reflux disease) 05/28/2016   • Atrial fibrillation 05/28/2016   • Depression 05/28/2016     Resolved Ambulatory Problems     Diagnosis Date Noted   • No Resolved Ambulatory Problems     Past Medical History:   Diagnosis Date   • Atrial fibrillation    • CVA (cerebral infarction)    • Depression    • GERD (gastroesophageal reflux disease)    • Hypertension    • Hyponatremia    • Skin cancer    • TIA (transient ischemic attack)        PAST SURGICAL HISTORY  Past Surgical History:   Procedure Laterality Date   • CATARACT EXTRACTION, BILATERAL  unknown   • " CHOLECYSTECTOMY  unknown       FAMILY HISTORY  History reviewed. No pertinent family history.    SOCIAL HISTORY  Social History     Social History   • Marital status:      Spouse name: N/A   • Number of children: N/A   • Years of education: N/A     Occupational History   • Not on file.     Social History Main Topics   • Smoking status: Former Smoker     Packs/day: 0.25     Years: 15.00     Types: Cigarettes     Start date: 1940     Quit date: 1955   • Smokeless tobacco: Not on file   • Alcohol use No   • Drug use: No   • Sexual activity: Defer     Other Topics Concern   • Not on file     Social History Narrative       ALLERGIES  Miconazole and Monistat [tioconazole]    REVIEW OF SYSTEMS  Review of Systems   Constitutional: Negative for fever.   HENT: Negative for sore throat.    Eyes: Negative.    Respiratory: Negative for cough and shortness of breath.    Cardiovascular: Negative for chest pain.   Gastrointestinal: Positive for abdominal pain (upper) and diarrhea (one week ago, now resolved). Negative for vomiting.   Genitourinary: Negative for dysuria.   Musculoskeletal: Positive for back pain (lumbar). Negative for neck pain.   Skin: Negative for rash.   Allergic/Immunologic: Negative.    Neurological: Negative for weakness, numbness and headaches.   Hematological: Negative.    Psychiatric/Behavioral: Negative.    All other systems reviewed and are negative.      PHYSICAL EXAM  ED Triage Vitals   Temp Heart Rate Resp BP SpO2   04/13/17 2142 04/13/17 2142 04/13/17 2142 04/13/17 2142 04/13/17 2142   97.5 °F (36.4 °C) 70 20 140/75 99 %      Temp src Heart Rate Source Patient Position BP Location FiO2 (%)   -- -- -- -- --              Physical Exam   Constitutional: She is oriented to person, place, and time and well-developed, well-nourished, and in no distress. No distress.   HENT:   Head: Normocephalic and atraumatic.   Eyes: EOM are normal. Pupils are equal, round, and reactive to light.   Neck: Normal  range of motion. Neck supple.   Cardiovascular: Normal rate, regular rhythm and normal heart sounds.    Pulmonary/Chest: Effort normal and breath sounds normal. No respiratory distress.   Abdominal: Soft. There is tenderness (mild, upper). There is no rebound and no guarding.   Musculoskeletal: Normal range of motion. She exhibits no edema.        Lumbar back: She exhibits tenderness.   Neurological: She is alert and oriented to person, place, and time. She has normal sensation and normal strength.   Skin: Skin is warm and dry. No rash noted.   Psychiatric: Mood and affect normal.   Nursing note and vitals reviewed.      LAB RESULTS  Lab Results (last 24 hours)     Procedure Component Value Units Date/Time    CBC & Differential [[id number]] Collected:  04/13/17 2233    Specimen:  Blood Updated:  04/13/17 2256    Narrative:       The following orders were created for panel order CBC & Differential.  Procedure                               Abnormality         Status                     ---------                               -----------         ------                     CBC Auto Differential[[id number]]         Abnormal            Final result                 Please view results for these tests on the individual orders.    Comprehensive Metabolic Panel [[id number]]  (Abnormal) Collected:  04/13/17 2233    Specimen:  Blood Updated:  04/13/17 2309     Glucose 110 (H) mg/dL      BUN 28 (H) mg/dL      Creatinine 0.64 mg/dL      Sodium 137 mmol/L      Potassium 5.3 (H) mmol/L      Chloride 97 (L) mmol/L      CO2 30.4 (H) mmol/L      Calcium 9.9 (H) mg/dL      Total Protein 7.6 g/dL      Albumin 3.90 g/dL      ALT (SGPT) 18 U/L      AST (SGOT) 17 U/L      Alkaline Phosphatase 75 U/L      Total Bilirubin 0.4 mg/dL      eGFR Non African Amer 86 mL/min/1.73      Globulin 3.7 gm/dL      A/G Ratio 1.1 g/dL      BUN/Creatinine Ratio 43.8 (H)     Anion Gap 9.6 mmol/L     Narrative:       The MDRD GFR formula is only valid for adults  with stable renal function between ages 18 and 70.    Lipase [[id number]]  (Normal) Collected:  04/13/17 2233    Specimen:  Blood Updated:  04/13/17 2309     Lipase 23 U/L     CBC Auto Differential [[id number]]  (Abnormal) Collected:  04/13/17 2233    Specimen:  Blood Updated:  04/13/17 2256     WBC 5.13 10*3/mm3      RBC 3.91 10*6/mm3      Hemoglobin 11.5 (L) g/dL      Hematocrit 36.2 %      MCV 92.6 fL      MCH 29.4 pg      MCHC 31.8 (L) g/dL      RDW 13.7 (H) %      RDW-SD 46.0 fl      MPV 11.6 fL      Platelets 154 10*3/mm3      Neutrophil % 71.9 %      Lymphocyte % 15.6 (L) %      Monocyte % 8.8 %      Eosinophil % 3.1 %      Basophil % 0.2 %      Immature Grans % 0.4 %      Neutrophils, Absolute 3.69 10*3/mm3      Lymphocytes, Absolute 0.80 (L) 10*3/mm3      Monocytes, Absolute 0.45 10*3/mm3      Eosinophils, Absolute 0.16 10*3/mm3      Basophils, Absolute 0.01 10*3/mm3      Immature Grans, Absolute 0.02 10*3/mm3     Digoxin Level [[id number]]  (Abnormal) Collected:  04/13/17 2233    Specimen:  Blood Updated:  04/14/17 0005     Digoxin 0.50 (L) ng/mL     Urinalysis With / Culture If Indicated [[id number]]  (Normal) Collected:  04/13/17 2319    Specimen:  Urine from Urine, Catheter Updated:  04/13/17 2341     Color, UA Yellow     Appearance, UA Clear     pH, UA 6.5     Specific Gravity, UA 1.018     Glucose, UA Negative     Ketones, UA Negative     Bilirubin, UA Negative     Blood, UA Negative     Protein, UA Negative     Leuk Esterase, UA Negative     Nitrite, UA Negative     Urobilinogen, UA 0.2 E.U./dL    Narrative:       Urine microscopic not indicated.          I ordered the above labs and reviewed the results    RADIOLOGY  CT Abdomen Pelvis With Contrast   Preliminary Result   1.  Enteritis/enterocolitis is suspected, no obstruction, perforation or   abscess.    2.  Hepatomegaly measuring 21.1 cm. Hepatic cysts measuring up to 2.2   cm.   3.  Cardiomegaly and severe atherosclerotic disease.              XR Spine  Lumbar 4+ View   Preliminary Result   Interval development of chronic osteoporotic compression fractures,   osteoporotic changes are present. In the midst of these extensive   findings, a subacute fracture cannot be excluded. In case of focal   tenderness MRI may be performed.                   I ordered the above noted radiological studies. Interpreted by radiologist. Reviewed by me in PACS.       PROCEDURES  Procedures      PROGRESS AND CONSULTS  ED Course     2157- Ordered blood work, lipase, UA, L-Spine XR and CT Abd/Pelvis for further evaluation. Ordered morphine and zofran for pain.    0004- Rechecked pt. Pt continues to complain of muscle spasms. Notified pt and family of the pt's lab and imaging results, including the enteritis seen on CT and worsening compression L-Spine fractures. I offered to admit the pt for pain control.  Pt agrees with the plan and all questions were addressed.    0021- Placed call to [facility] for admission.    12:22 AM  Latest vital signs   BP- 157/97 HR- 73 Temp- 97.5 °F (36.4 °C) O2 sat- 96%    0047- Discussed the pt's case with Dr. Curry ([facility]) who agrees to admit the pt to a Med/Surg bed.    MEDICAL DECISION MAKING  Results were reviewed/discussed with the patient and they were also made aware of online access. Pt also made aware that some labs, such as cultures, will not be resulted during ER visit and follow up with PMD is necessary.     MDM  Number of Diagnoses or Management Options  Abdominal cramping:   Acute midline low back pain without sciatica:   Compression fracture of lumbar vertebra, closed, initial encounter:   Diagnosis management comments: Patient's CT the abdomen and pelvis showed fluid within the colon but was otherwise unremarkable.  X-rays of her L-spine showed multiple compression deformities of uncertain acuity.  On exam, the patient was quite tender over her lumbar spine.  Her pain is worse with any movement or walking.  Patient was given IV morphine and IV  Zofran in the ER.  Patient will need to be admitted for pain control and possible MRI.  Case was discussed with Dr. Larios and he will admit the patient.       Amount and/or Complexity of Data Reviewed  Clinical lab tests: ordered and reviewed (UA is unremarkable.)  Tests in the radiology section of CPT®: ordered and reviewed (L-Spine XR shows interval development of chronic osteoporotic compression fractures.)  Decide to obtain previous medical records or to obtain history from someone other than the patient: yes  Review and summarize past medical records: yes (Pt was last admitted in May 2016 for acute CVA in the left occipital lobe.)  Discuss the patient with other providers: yes (D/w Dr. Curry ([facility]))  Independent visualization of images, tracings, or specimens: yes           DIAGNOSIS  Final diagnoses:   Acute midline low back pain without sciatica   Compression fracture of lumbar vertebra, closed, initial encounter   Abdominal cramping       DISPOSITION  ADMISSION    Discussed treatment plan and reason for admission with pt/family and admitting physician.  Pt/family voiced understanding of the plan for admission for further testing/treatment as needed.     Latest Documented Vital Signs:  As of 1:51 AM  BP- 137/69 HR- 67 Temp- 97.8 °F (36.6 °C) O2 sat- 98%    --  Documentation assistance provided by naldo Valle for Dr. Cho.  Information recorded by the scribe was done at my direction and has been verified and validated by me.     Laxmi Valle  04/14/17 0058       You Cho MD  04/14/17 0151

## 2017-04-15 VITALS
WEIGHT: 105 LBS | OXYGEN SATURATION: 93 % | RESPIRATION RATE: 16 BRPM | TEMPERATURE: 96.8 F | BODY MASS INDEX: 20.62 KG/M2 | HEIGHT: 60 IN | HEART RATE: 56 BPM | DIASTOLIC BLOOD PRESSURE: 54 MMHG | SYSTOLIC BLOOD PRESSURE: 115 MMHG

## 2017-04-15 LAB
ANION GAP SERPL CALCULATED.3IONS-SCNC: 8.2 MMOL/L
BASOPHILS # BLD AUTO: 0.01 10*3/MM3 (ref 0–0.2)
BASOPHILS NFR BLD AUTO: 0.2 % (ref 0–1.5)
BUN BLD-MCNC: 26 MG/DL (ref 8–23)
BUN/CREAT SERPL: 41.9 (ref 7–25)
CALCIUM SPEC-SCNC: 9.2 MG/DL (ref 8.2–9.6)
CHLORIDE SERPL-SCNC: 101 MMOL/L (ref 98–107)
CO2 SERPL-SCNC: 29.8 MMOL/L (ref 22–29)
CREAT BLD-MCNC: 0.62 MG/DL (ref 0.57–1)
DEPRECATED RDW RBC AUTO: 45.4 FL (ref 37–54)
EOSINOPHIL # BLD AUTO: 0.25 10*3/MM3 (ref 0–0.7)
EOSINOPHIL NFR BLD AUTO: 5.4 % (ref 0.3–6.2)
ERYTHROCYTE [DISTWIDTH] IN BLOOD BY AUTOMATED COUNT: 13.6 % (ref 11.7–13)
GFR SERPL CREATININE-BSD FRML MDRD: 89 ML/MIN/1.73
GLUCOSE BLD-MCNC: 99 MG/DL (ref 65–99)
HCT VFR BLD AUTO: 34.4 % (ref 35.6–45.5)
HGB BLD-MCNC: 10.8 G/DL (ref 11.9–15.5)
IMM GRANULOCYTES # BLD: 0 10*3/MM3 (ref 0–0.03)
IMM GRANULOCYTES NFR BLD: 0 % (ref 0–0.5)
LYMPHOCYTES # BLD AUTO: 1.07 10*3/MM3 (ref 0.9–4.8)
LYMPHOCYTES NFR BLD AUTO: 23.3 % (ref 19.6–45.3)
MCH RBC QN AUTO: 28.8 PG (ref 26.9–32)
MCHC RBC AUTO-ENTMCNC: 31.4 G/DL (ref 32.4–36.3)
MCV RBC AUTO: 91.7 FL (ref 80.5–98.2)
MONOCYTES # BLD AUTO: 0.45 10*3/MM3 (ref 0.2–1.2)
MONOCYTES NFR BLD AUTO: 9.8 % (ref 5–12)
NEUTROPHILS # BLD AUTO: 2.81 10*3/MM3 (ref 1.9–8.1)
NEUTROPHILS NFR BLD AUTO: 61.3 % (ref 42.7–76)
PLATELET # BLD AUTO: 151 10*3/MM3 (ref 140–500)
PMV BLD AUTO: 11.4 FL (ref 6–12)
POTASSIUM BLD-SCNC: 4.8 MMOL/L (ref 3.5–5.2)
RBC # BLD AUTO: 3.75 10*6/MM3 (ref 3.9–5.2)
SODIUM BLD-SCNC: 139 MMOL/L (ref 136–145)
WBC NRBC COR # BLD: 4.59 10*3/MM3 (ref 4.5–10.7)

## 2017-04-15 PROCEDURE — G0378 HOSPITAL OBSERVATION PER HR: HCPCS

## 2017-04-15 PROCEDURE — 80048 BASIC METABOLIC PNL TOTAL CA: CPT | Performed by: INTERNAL MEDICINE

## 2017-04-15 PROCEDURE — 85025 COMPLETE CBC W/AUTO DIFF WBC: CPT | Performed by: INTERNAL MEDICINE

## 2017-04-15 RX ORDER — HYDROCODONE BITARTRATE AND ACETAMINOPHEN 5; 325 MG/1; MG/1
1 TABLET ORAL EVERY 6 HOURS PRN
Qty: 12 TABLET | Refills: 0 | Status: SHIPPED | OUTPATIENT
Start: 2017-04-15 | End: 2017-04-18

## 2017-04-15 RX ORDER — LIDOCAINE 50 MG/G
1 PATCH TOPICAL
Qty: 7 PATCH | Refills: 0 | Status: SHIPPED | OUTPATIENT
Start: 2017-04-15 | End: 2018-01-01

## 2017-04-15 RX ADMIN — LIDOCAINE 1 PATCH: 50 PATCH CUTANEOUS at 09:10

## 2017-04-15 RX ADMIN — PANTOPRAZOLE SODIUM 40 MG: 40 TABLET, DELAYED RELEASE ORAL at 06:58

## 2017-04-15 RX ADMIN — LACTASE TAB 3000 UNIT 3000 UNITS: 3000 TAB at 09:09

## 2017-04-15 RX ADMIN — OXYCODONE HYDROCHLORIDE AND ACETAMINOPHEN 1 TABLET: 5; 325 TABLET ORAL at 06:58

## 2017-04-15 RX ADMIN — POTASSIUM CHLORIDE 20 MEQ: 750 CAPSULE, EXTENDED RELEASE ORAL at 09:10

## 2017-04-15 RX ADMIN — ISOSORBIDE MONONITRATE 30 MG: 30 TABLET ORAL at 09:10

## 2017-04-15 RX ADMIN — FERROUS SULFATE TAB 325 MG (65 MG ELEMENTAL FE) 325 MG: 325 (65 FE) TAB at 09:09

## 2017-04-15 RX ADMIN — RIVAROXABAN 15 MG: 15 TABLET, FILM COATED ORAL at 09:10

## 2017-04-15 RX ADMIN — OXYCODONE HYDROCHLORIDE AND ACETAMINOPHEN 1 TABLET: 5; 325 TABLET ORAL at 01:56

## 2017-04-15 RX ADMIN — POLYETHYLENE GLYCOL 3350 17 G: 17 POWDER, FOR SOLUTION ORAL at 09:09

## 2017-04-15 RX ADMIN — SERTRALINE 25 MG: 25 TABLET, FILM COATED ORAL at 09:15

## 2017-04-15 NOTE — DISCHARGE SUMMARY
Date of Admission: 4/13/2017  Date of Discharge:  4/15/2017  Primary Care Physician: Hoa Kerr, APRN     Discharge Diagnosis:  Principal Problem:    Acute midline low back pain without sciatica  Active Problems:    HTN (hypertension)    GERD (gastroesophageal reflux disease)    Atrial fibrillation    Enteritis    Compression fracture of lumbar vertebra      DETAILS OF HOSPITAL STAY     Pertinent Test Results and Procedures Performed    CT scan of the abdomen and pelvis;  1.  Enteritis/enterocolitis is suspected, no obstruction, perforation or  abscess.   2.  Hepatomegaly measuring 21.1 cm. Hepatic cysts measuring up to 2.2  cm.  3.  Cardiomegaly and severe atherosclerotic disease.    X-ray of the lumbar spine:  Interval development of chronic osteoporotic compression fractures,  osteoporotic changes are present. In the midst of these extensive  findings, a subacute fracture cannot be excluded. In case of focal  tenderness of the lumbar spine, MRI may be performed.        Hospital Course    This is a 89-year-old white female who presented to the emergency room with complaints of upper abdominal cramping and low back pain.  Please see H&P for full details of admission.  She was found to have a enteritis on CT scan.  Her cramping has since resolved.  She's not having any nausea or vomiting.  She is not having diarrhea in fact his local hospital today.  She is getting MiraLAX and other medications to relieve her bowels.  She has no abdominal tenderness today.  She had an x-ray of the lumbar spine as described above.  I discussed this with her and gave her the option of having an MRI performed and possible neurosurgery evaluation.  She declined both of these things.  She angulated 200 feet with physical therapy and was determined to be at her baseline.  She is not having any extraordinary pain.  I did apply a Lidoderm patch and we did give her a couple doses of Percocet which has controlled her pain nicely.   Today she is feeling a little loopy.  This is most likely due to the oral pain medication.  I'll change her from Percocet to [place] at the lowest possible dose and will provide her with a few days of this that she can take at discharge.  Otherwise she was doing well today and appears to be at her baseline.  I discussed this with her daughter and all are in agreement with plan for discharge today.  She lives at an assisted living facility at [facility].  Physical therapy has deemed her appropriate to return there.  Her daughter states that they do have a physical therapist on staff there that can assist her if she needs it moving forward.  She will be discharged today.  She has a rolling walker already at her assisted living facility and that is all that she has required here.    Consults:   Consults     Date and Time Order Name Status Description    4/14/2017 0021 LHTEOFILO (on-call MD unless specified) Completed             Condition on Discharge: Stable    Discharge Disposition  Home or Self Care    Discharge Medications   Isadora Powell   Home Medication Instructions FRANCIS:[id number]    Printed on:04/15/17 1014   Medication Information                      acetaminophen (TYLENOL) 325 MG tablet  Take 650 mg by mouth Every 6 (Six) Hours As Needed for mild pain (1-3).             aluminum-magnesium hydroxide-simethicone (MAALOX/MYLANTA) 200-200-20 MG/5ML suspension  Take 30 mL by mouth daily as needed for indigestion or heartburn.             amLODIPine (NORVASC) 2.5 MG tablet  Take 2.5 mg by mouth daily.             bisacodyl (BISACODYL LAXATIVE) 10 MG suppository  Insert 10 mg into the rectum As Needed for constipation.             Cetirizine HCl (ZYRTEC ALLERGY) 10 MG capsule  Take  by mouth daily.             Cholecalciferol (VITAMIN D-3) 1000 UNITS capsule  Take 1,000 Units by mouth Daily.             digoxin (LANOXIN) 125 MCG tablet  Take 125 mcg by mouth Every Other Day. At 11 am, Hold for heart rate <  60             ferrous sulfate 325 (65 FE) MG tablet  Take 325 mg by mouth daily with breakfast.             HYDROcodone-acetaminophen (NORCO) 5-325 MG per tablet  Take 1 tablet by mouth Every 6 (Six) Hours As Needed for Moderate Pain (4-6) for up to 3 days.             isosorbide mononitrate (IMDUR) 30 MG 24 hr tablet  Take 30 mg by mouth Daily.             lactase (LACTAID) 3000 UNITS tablet  Take 3,000 Units by mouth 3 (Three) Times a Day With Meals.             lidocaine (LIDODERM) 5 %  Place 1 patch on the skin Daily. Remove & Discard patch within 12 hours or as directed by MD             lisinopril (PRINIVIL,ZESTRIL) 20 MG tablet  Take 20 mg by mouth daily.             Loperamide HCl (IMODIUM A-D PO)  Take 2 mg by mouth 2 (Two) Times a Day.             Multiple Vitamins-Minerals (CERTAVITE SENIOR/ANTIOXIDANT PO)  Take  by mouth Daily.             nitroglycerin (NITROSTAT) 0.4 MG SL tablet  Place 0.4 mg under the tongue Every 5 (Five) Minutes As Needed for chest pain. Take no more than 3 doses in 15 minutes.             omeprazole (priLOSEC) 20 MG capsule  Take 20 mg by mouth Daily.             ondansetron (ZOFRAN) 4 MG tablet  Take 4 mg by mouth Every 8 (Eight) Hours As Needed for nausea or vomiting.             oyster shell calcium 500 MG tablet tablet  Take  by mouth 2 (two) times a day.             polyethylene glycol (MIRALAX) packet  Take 17 g by mouth Daily.             potassium chloride (K-DUR,KLOR-CON) 20 MEQ CR tablet  Take 20 mEq by mouth 2 (Two) Times a Day.             rivaroxaban (XARELTO) 15 MG tablet  Take 15 mg by mouth daily.             sertraline (ZOLOFT) 25 MG tablet  Take 25 mg by mouth daily.             traMADol (ULTRAM) 50 MG tablet  Take 50 mg by mouth Every 6 (Six) Hours As Needed for moderate pain (4-6).             traZODone (DESYREL) 50 MG tablet  Take 50 mg by mouth At Night As Needed for sleep.                 Discharge Diet:   Diet Instructions     Diet: Regular; Thin Liquids,  No Restrictions       Discharge Diet:  Regular   Fluid Consistency:  Thin Liquids, No Restrictions                 Activity at Discharge:   Activity Instructions     Activity as Tolerated                     Follow-up Appointments  No future appointments.  Additional Instructions for the Follow-ups that You Need to Schedule     Discharge Follow-up with PCP    As directed    Follow Up Details:  1 week                       I have examined and discussed discharge planning with the patient today.     Rolando Gorman MD  04/15/17  10:14 AM    Time: Discharge less than 30 min

## 2017-04-15 NOTE — PLAN OF CARE
Problem: Patient Care Overview (Adult)  Goal: Plan of Care Review  Outcome: Ongoing (interventions implemented as appropriate)    04/14/17 1951 04/15/17 0341   Coping/Psychosocial Response Interventions   Plan Of Care Reviewed With patient --    Patient Care Overview   Progress improving --    Outcome Evaluation   Outcome Summary/Follow up Plan --  Patient was in a lot of pain last night. Patient was medicated PRN for pain. No family at bedside all night. Will continue to monitor labs, vital signs and comfort.       Goal: Adult Individualization and Mutuality  Outcome: Ongoing (interventions implemented as appropriate)  Goal: Discharge Needs Assessment  Outcome: Ongoing (interventions implemented as appropriate)    Problem: Fall Risk (Adult)  Goal: Absence of Falls  Outcome: Ongoing (interventions implemented as appropriate)    Problem: Pain, Acute (Adult)  Goal: Acceptable Pain Control/Comfort Level  Outcome: Ongoing (interventions implemented as appropriate)

## 2017-04-17 NOTE — PROGRESS NOTES
Discharge Planning Assessment  [facility]     Patient Name: Isadora Powell  MRN: [id number]  Today's Date: 4/17/2017    Admit Date: 4/13/2017          Discharge Needs Assessment     None            Discharge Plan       04/17/17 1254    Final Note    Final Note Discharged to [facility] and family provided the transportation back.         Discharge Placement     Facility/Agency Request Status Selected? Address Phone Number Fax Number    [facility] Accepted     [address] [phone]        Expected Discharge Date and Time     Expected Discharge Date Expected Discharge Time    Apr 15, 2017               Demographic Summary     None            Functional Status     None            Psychosocial     None            Abuse/Neglect     None            Legal     None            Substance Abuse     None            Patient Forms     None          Shital Mcgill RN

## 2017-06-12 ENCOUNTER — APPOINTMENT (OUTPATIENT)
Dept: CT IMAGING | Facility: HOSPITAL | Age: 82
End: 2017-06-12

## 2017-06-12 ENCOUNTER — HOSPITAL ENCOUNTER (EMERGENCY)
Facility: HOSPITAL | Age: 82
Discharge: HOME | End: 2017-06-12
Attending: EMERGENCY MEDICINE | Admitting: EMERGENCY MEDICINE

## 2017-06-12 VITALS
WEIGHT: 93 LBS | SYSTOLIC BLOOD PRESSURE: 174 MMHG | RESPIRATION RATE: 16 BRPM | HEIGHT: 60 IN | OXYGEN SATURATION: 92 % | DIASTOLIC BLOOD PRESSURE: 84 MMHG | TEMPERATURE: 98.1 F | BODY MASS INDEX: 18.26 KG/M2 | HEART RATE: 65 BPM

## 2017-06-12 DIAGNOSIS — D68.9 COAGULOPATHY (HCC): ICD-10-CM

## 2017-06-12 DIAGNOSIS — G45.9 TRANSIENT CEREBRAL ISCHEMIA, UNSPECIFIED TYPE: Primary | ICD-10-CM

## 2017-06-12 DIAGNOSIS — I48.20 CHRONIC ATRIAL FIBRILLATION (HCC): ICD-10-CM

## 2017-06-12 LAB
ALBUMIN SERPL-MCNC: 3.8 G/DL (ref 3.5–5.2)
ALBUMIN/GLOB SERPL: 1 G/DL
ALP SERPL-CCNC: 71 U/L (ref 39–117)
ALT SERPL W P-5'-P-CCNC: 15 U/L (ref 1–33)
ANION GAP SERPL CALCULATED.3IONS-SCNC: 10.7 MMOL/L
AST SERPL-CCNC: 20 U/L (ref 1–32)
BASOPHILS # BLD AUTO: 0.01 10*3/MM3 (ref 0–0.2)
BASOPHILS NFR BLD AUTO: 0.2 % (ref 0–1.5)
BILIRUB SERPL-MCNC: 0.4 MG/DL (ref 0.1–1.2)
BUN BLD-MCNC: 24 MG/DL (ref 8–23)
BUN/CREAT SERPL: 38.1 (ref 7–25)
CALCIUM SPEC-SCNC: 9.7 MG/DL (ref 8.2–9.6)
CHLORIDE SERPL-SCNC: 96 MMOL/L (ref 98–107)
CO2 SERPL-SCNC: 27.3 MMOL/L (ref 22–29)
CREAT BLD-MCNC: 0.63 MG/DL (ref 0.57–1)
DEPRECATED RDW RBC AUTO: 50.4 FL (ref 37–54)
DIGOXIN SERPL-MCNC: 0.6 NG/ML (ref 0.6–1.2)
EOSINOPHIL # BLD AUTO: 0.11 10*3/MM3 (ref 0–0.7)
EOSINOPHIL NFR BLD AUTO: 2.3 % (ref 0.3–6.2)
ERYTHROCYTE [DISTWIDTH] IN BLOOD BY AUTOMATED COUNT: 14.9 % (ref 11.7–13)
GFR SERPL CREATININE-BSD FRML MDRD: 88 ML/MIN/1.73
GLOBULIN UR ELPH-MCNC: 3.7 GM/DL
GLUCOSE BLD-MCNC: 100 MG/DL (ref 65–99)
HCT VFR BLD AUTO: 37.2 % (ref 35.6–45.5)
HGB BLD-MCNC: 11.9 G/DL (ref 11.9–15.5)
IMM GRANULOCYTES # BLD: 0 10*3/MM3 (ref 0–0.03)
IMM GRANULOCYTES NFR BLD: 0 % (ref 0–0.5)
INR PPP: 2.04 (ref 0.9–1.1)
LYMPHOCYTES # BLD AUTO: 1.18 10*3/MM3 (ref 0.9–4.8)
LYMPHOCYTES NFR BLD AUTO: 24.4 % (ref 19.6–45.3)
MAGNESIUM SERPL-MCNC: 2 MG/DL (ref 1.7–2.3)
MCH RBC QN AUTO: 29.5 PG (ref 26.9–32)
MCHC RBC AUTO-ENTMCNC: 32 G/DL (ref 32.4–36.3)
MCV RBC AUTO: 92.1 FL (ref 80.5–98.2)
MONOCYTES # BLD AUTO: 0.45 10*3/MM3 (ref 0.2–1.2)
MONOCYTES NFR BLD AUTO: 9.3 % (ref 5–12)
NEUTROPHILS # BLD AUTO: 3.08 10*3/MM3 (ref 1.9–8.1)
NEUTROPHILS NFR BLD AUTO: 63.8 % (ref 42.7–76)
PLATELET # BLD AUTO: 165 10*3/MM3 (ref 140–500)
PMV BLD AUTO: 12 FL (ref 6–12)
POTASSIUM BLD-SCNC: 4.7 MMOL/L (ref 3.5–5.2)
PROT SERPL-MCNC: 7.5 G/DL (ref 6–8.5)
PROTHROMBIN TIME: 22.4 SECONDS (ref 11.7–14.2)
RBC # BLD AUTO: 4.04 10*6/MM3 (ref 3.9–5.2)
SODIUM BLD-SCNC: 134 MMOL/L (ref 136–145)
TROPONIN T SERPL-MCNC: <0.01 NG/ML (ref 0–0.03)
WBC NRBC COR # BLD: 4.83 10*3/MM3 (ref 4.5–10.7)

## 2017-06-12 PROCEDURE — 80162 ASSAY OF DIGOXIN TOTAL: CPT | Performed by: EMERGENCY MEDICINE

## 2017-06-12 PROCEDURE — 83735 ASSAY OF MAGNESIUM: CPT | Performed by: EMERGENCY MEDICINE

## 2017-06-12 PROCEDURE — 85610 PROTHROMBIN TIME: CPT | Performed by: EMERGENCY MEDICINE

## 2017-06-12 PROCEDURE — 93010 ELECTROCARDIOGRAM REPORT: CPT | Performed by: INTERNAL MEDICINE

## 2017-06-12 PROCEDURE — 80053 COMPREHEN METABOLIC PANEL: CPT | Performed by: EMERGENCY MEDICINE

## 2017-06-12 PROCEDURE — 70450 CT HEAD/BRAIN W/O DYE: CPT

## 2017-06-12 PROCEDURE — 85025 COMPLETE CBC W/AUTO DIFF WBC: CPT | Performed by: EMERGENCY MEDICINE

## 2017-06-12 PROCEDURE — 84484 ASSAY OF TROPONIN QUANT: CPT | Performed by: EMERGENCY MEDICINE

## 2017-06-12 PROCEDURE — 93005 ELECTROCARDIOGRAM TRACING: CPT | Performed by: EMERGENCY MEDICINE

## 2017-06-12 PROCEDURE — 99284 EMERGENCY DEPT VISIT MOD MDM: CPT

## 2017-06-12 RX ORDER — SODIUM CHLORIDE 0.9 % (FLUSH) 0.9 %
10 SYRINGE (ML) INJECTION AS NEEDED
Status: DISCONTINUED | OUTPATIENT
Start: 2017-06-12 | End: 2017-06-13 | Stop reason: HOSPADM

## 2017-06-12 NOTE — ED PROVIDER NOTES
" EMERGENCY DEPARTMENT ENCOUNTER    CHIEF COMPLAINT  Chief Complaint: speech difficulty  History given by: patient  History limited by: poor historian  Room Number: 22/22  PMD: JANETTE Kaur      HPI:  Pt is a 89 y.o. female who presents complaining of episodic speech difficulty onset 1 day ago w/ and again earlier tonight. Pt states \"I had trouble saying what I wanted to tell\" her friends but is unsure of the duration of the episodes. Pt also c/o decreased appetite, frontal HA, but denies difficulty moving extremities, difficulty ambulating, or other sx. Pt has chronic poor vision and hx of multiple TIA and stroke x2. She states these episodes felt similar to previous TIA. Pt states sx are currently resolved. Pt takes Xarelto for hx of stroke.    Duration:  unknown  Onset: sudden  Timing: episodic  Location: n/a  Radiation: n/a  Quality: \"I had trouble saying what I wanted to tell\" her friends  Intensity/Severity: mild  Progression: resolved  Associated Symptoms: decreased appetite, HA  Aggravating Factors: none  Alleviating Factors: none  Previous Episodes: hx of TIA and strokes  Treatment before arrival: none    PAST MEDICAL HISTORY  Active Ambulatory Problems     Diagnosis Date Noted   • HTN (hypertension) 05/27/2016   • CVA (cerebral infarction) 05/28/2016   • GERD (gastroesophageal reflux disease) 05/28/2016   • GERD (gastroesophageal reflux disease) 05/28/2016   • Atrial fibrillation 05/28/2016   • Depression 05/28/2016   • Acute midline low back pain without sciatica 04/14/2017   • Enteritis 04/14/2017   • Compression fracture of lumbar vertebra 04/14/2017     Resolved Ambulatory Problems     Diagnosis Date Noted   • No Resolved Ambulatory Problems     Past Medical History:   Diagnosis Date   • Atrial fibrillation    • CVA (cerebral infarction)    • Depression    • GERD (gastroesophageal reflux disease)    • Hypertension    • Hyponatremia    • IBS (irritable bowel syndrome)    • Rib fracture    • " Skin cancer    • TIA (transient ischemic attack)        PAST SURGICAL HISTORY  Past Surgical History:   Procedure Laterality Date   • CATARACT EXTRACTION, BILATERAL  unknown   • CHOLECYSTECTOMY  unknown       FAMILY HISTORY  History reviewed. No pertinent family history.    SOCIAL HISTORY  Social History     Social History   • Marital status:      Spouse name: N/A   • Number of children: N/A   • Years of education: N/A     Occupational History   • Not on file.     Social History Main Topics   • Smoking status: Former Smoker     Packs/day: 0.25     Years: 15.00     Types: Cigarettes     Start date: 1940     Quit date: 1955   • Smokeless tobacco: Never Used   • Alcohol use No   • Drug use: No   • Sexual activity: Defer     Other Topics Concern   • Not on file     Social History Narrative   • No narrative on file       ALLERGIES  Miconazole and Monistat [tioconazole]    REVIEW OF SYSTEMS  Review of Systems   Constitutional: Positive for appetite change (decreased). Negative for chills and fever.   HENT: Negative for congestion and sore throat.    Eyes: Negative.    Respiratory: Negative for cough and shortness of breath.    Cardiovascular: Negative for chest pain and leg swelling.   Gastrointestinal: Negative for abdominal pain, diarrhea and vomiting.   Genitourinary: Negative for difficulty urinating and dysuria.   Musculoskeletal: Negative for back pain and neck pain.   Skin: Negative for rash and wound.   Allergic/Immunologic: Negative.    Neurological: Positive for speech difficulty and headaches. Negative for dizziness, weakness and numbness.   Psychiatric/Behavioral: Negative.    All other systems reviewed and are negative.      PHYSICAL EXAM  ED Triage Vitals   Temp Heart Rate Resp BP SpO2   06/12/17 1851 06/12/17 1851 06/12/17 1851 06/12/17 1851 06/12/17 1851   98.1 °F (36.7 °C) 80 18 160/80 98 %      Temp src Heart Rate Source Patient Position BP Location FiO2 (%)   06/12/17 1851 06/12/17 1851 06/12/17  1851 06/12/17 1922 --   Tympanic Monitor Sitting Right arm        Physical Exam   Constitutional: She is oriented to person, place, and time and well-developed, well-nourished, and in no distress. No distress.   HENT:   Head: Normocephalic and atraumatic.   Eyes: EOM are normal. Pupils are equal, round, and reactive to light.   No visual deficits.   Neck: Normal range of motion. Neck supple.   Cardiovascular: Normal rate and normal heart sounds.  An irregular rhythm present.   Pulmonary/Chest: Effort normal and breath sounds normal. No respiratory distress.   Abdominal: Soft. There is no tenderness. There is no rebound and no guarding.   Musculoskeletal: Normal range of motion. She exhibits no edema.   Neurological: She is alert and oriented to person, place, and time. She has normal sensation and normal strength.   Cranial nerves intact.   Skin: Skin is warm and dry. No rash noted.   Psychiatric: Mood and affect normal.   Nursing note and vitals reviewed.      LAB RESULTS  Lab Results (last 24 hours)     Procedure Component Value Units Date/Time    CBC & Differential [[id number]] Collected:  06/12/17 2004    Specimen:  Blood Updated:  06/12/17 2022    Narrative:       The following orders were created for panel order CBC & Differential.  Procedure                               Abnormality         Status                     ---------                               -----------         ------                     CBC Auto Differential[[id number]]         Abnormal            Final result                 Please view results for these tests on the individual orders.    Comprehensive Metabolic Panel [[id number]]  (Abnormal) Collected:  06/12/17 2004    Specimen:  Blood Updated:  06/12/17 2042     Glucose 100 (H) mg/dL      BUN 24 (H) mg/dL      Creatinine 0.63 mg/dL      Sodium 134 (L) mmol/L      Potassium 4.7 mmol/L      Chloride 96 (L) mmol/L      CO2 27.3 mmol/L      Calcium 9.7 (H) mg/dL      Total Protein 7.5 g/dL       Albumin 3.80 g/dL      ALT (SGPT) 15 U/L      AST (SGOT) 20 U/L      Alkaline Phosphatase 71 U/L      Total Bilirubin 0.4 mg/dL      eGFR Non African Amer 88 mL/min/1.73      Globulin 3.7 gm/dL      A/G Ratio 1.0 g/dL      BUN/Creatinine Ratio 38.1 (H)     Anion Gap 10.7 mmol/L     Narrative:       The MDRD GFR formula is only valid for adults with stable renal function between ages 18 and 70.    Troponin [[id number]]  (Normal) Collected:  06/12/17 2004    Specimen:  Blood Updated:  06/12/17 2042     Troponin T <0.010 ng/mL     Narrative:       Troponin T Reference Ranges:  Less than 0.03 ng/mL:    Negative for AMI  0.03 to 0.09 ng/mL:      Indeterminant for AMI  Greater than 0.09 ng/mL: Positive for AMI    Magnesium [[id number]]  (Normal) Collected:  06/12/17 2004    Specimen:  Blood Updated:  06/12/17 2042     Magnesium 2.0 mg/dL     Protime-INR [[id number]]  (Abnormal) Collected:  06/12/17 2004    Specimen:  Blood Updated:  06/12/17 2031     Protime 22.4 (H) Seconds      INR 2.04 (H)    Digoxin Level [[id number]]  (Normal) Collected:  06/12/17 2004    Specimen:  Blood Updated:  06/12/17 2043     Digoxin 0.60 ng/mL     CBC Auto Differential [[id number]]  (Abnormal) Collected:  06/12/17 2004    Specimen:  Blood Updated:  06/12/17 2022     WBC 4.83 10*3/mm3      RBC 4.04 10*6/mm3      Hemoglobin 11.9 g/dL      Hematocrit 37.2 %      MCV 92.1 fL      MCH 29.5 pg      MCHC 32.0 (L) g/dL      RDW 14.9 (H) %      RDW-SD 50.4 fl      MPV 12.0 fL      Platelets 165 10*3/mm3      Neutrophil % 63.8 %      Lymphocyte % 24.4 %      Monocyte % 9.3 %      Eosinophil % 2.3 %      Basophil % 0.2 %      Immature Grans % 0.0 %      Neutrophils, Absolute 3.08 10*3/mm3      Lymphocytes, Absolute 1.18 10*3/mm3      Monocytes, Absolute 0.45 10*3/mm3      Eosinophils, Absolute 0.11 10*3/mm3      Basophils, Absolute 0.01 10*3/mm3      Immature Grans, Absolute 0.00 10*3/mm3         I ordered the above labs and reviewed the  results      RADIOLOGY  CT Head Without Contrast   Final Result   1. Stable chronic-appearing white matter changes as discussed, no acute   finding.                           This study was performed with techniques to keep radiation doses as low   as reasonably achievable (ALARA). Individualized dose reduction   techniques using automated exposure control or adjustment of vA and/or   kV according to the patient size were employed.        This report was finalized on 6/12/2017 10:14 PM by Reece Sterling MD.            I ordered the above noted radiological studies. Interpreted by radiologist. Discussed with radiologist (Dr. Sterling). Reviewed by me in PACS.       PROCEDURES  Procedures  EKG           EKG time: 2009  Rhythm/Rate: Afib rate of 76  QRS, axis: LVH  ST and T waves: diffuse non-specific changes     Interpreted Contemporaneously by me, independently viewed  Similar compared to prior 12/27/16      PROGRESS AND CONSULTS  ED Course     2003  Ordered digoxin level, troponin, magnesium, PT-INR, blood work EKG for further evaluation.    2030  Rechecked pt who is resting comfortably w/ mild HA. D/w pt and family current medications and plan to CT head. Per family pt has hx of similar episodes. Pt and family understand and agree with the plan. All questions answered.    2224  Rechecked pt who still c/o mild frontal HA but is resting comfortably. Refuses any medicine again.  Son is now at bedside. D/w pt and family CT head that is negative acute, unremarkable lab work, unchanged EKG, and options for admission. Pt states she has been worked up for this previously and would like to be d/c back to her NH. After lengthy discussion pt would still like to go home. Recommended f/u w/ PCP. Gave RTER warnings. Pt and family understand and agree with the plan. All questions answered.  Pt has been able to ambulate in the ED w/o assistance, and family states pt is at baseline.      MEDICAL DECISION MAKING  Results were  reviewed/discussed with the patient and they were also made aware of online access. Pt also made aware that some labs, such as cultures, will not be resulted during ER visit and follow up with PMD is necessary.     MDM  Number of Diagnoses or Management Options     Amount and/or Complexity of Data Reviewed  Clinical lab tests: ordered and reviewed (Lab work is unremarkable.)  Tests in the radiology section of CPT®: ordered and reviewed (CT head negative acute.)  Tests in the medicine section of CPT®: ordered and reviewed (See EKG procedure note.)  Decide to obtain previous medical records or to obtain history from someone other than the patient: yes  Obtain history from someone other than the patient: yes  Review and summarize past medical records: yes  Independent visualization of images, tracings, or specimens: yes    Patient Progress  Patient progress: stable         DIAGNOSIS  Final diagnoses:   Transient cerebral ischemia, unspecified type   Chronic atrial fibrillation   Xarelto-induced coagulopathy       DISPOSITION  DISCHARGE    Patient discharged in stable condition.    Reviewed implications of results, diagnosis, meds, responsibility to follow up, warning signs and symptoms of possible worsening, potential complications and reasons to return to ER, including signs of stroke.    Patient/Family voiced understanding of above instructions.    Discussed plan for discharge, as there is no emergent indication for admission.  Pt/family is agreeable and understands need for follow up and repeat testing.  Pt is aware that discharge does not mean that nothing is wrong but it indicates no emergency is present that requires admission and they must continue care with follow-up as given below or physician of their choice.     FOLLOW-UP  Hoa Kerr, APRN  [address] [facility] [address]  [facility] [address] [phone]    Call  For follow-up         Medication List      Notice     No changes were made to your  prescriptions during this visit.        Latest Documented Vital Signs:  As of 10:34 PM  BP- (!) 142/122 HR- 67 Temp- 98.1 °F (36.7 °C) (Tympanic) O2 sat- 95%    --  Documentation assistance provided by naldo Villegas for Dr. Herrear.  Information recorded by the scribe was done at my direction and has been verified and validated by me.     Reece Villegas  06/12/17 [id number]       Misbah Herrera MD  06/12/17 0352

## 2017-06-12 NOTE — ED TRIAGE NOTES
Pt to ED from [facility] for blurred vision and difficulty speaking onset last night around dinner time. Hx of TIA

## 2017-06-12 NOTE — ED NOTES
"Patient complains of headache above her eyes, and two episodes of not being able to \"say what I wanted to say\", once last night and once tonight; patient able to communicate without difficulty at this time. Patient alert and oriented x3. Patient does not complain of shortness of air, despite tachypnea.     Beti Keita RN  06/12/17 1925    "

## 2017-11-07 ENCOUNTER — OFFICE VISIT (OUTPATIENT)
Dept: CARDIOLOGY | Facility: CLINIC | Age: 82
End: 2017-11-07

## 2017-11-07 VITALS
HEART RATE: 73 BPM | DIASTOLIC BLOOD PRESSURE: 70 MMHG | SYSTOLIC BLOOD PRESSURE: 122 MMHG | BODY MASS INDEX: 19.44 KG/M2 | WEIGHT: 99 LBS | HEIGHT: 60 IN

## 2017-11-07 DIAGNOSIS — I48.20 CHRONIC ATRIAL FIBRILLATION (HCC): Primary | ICD-10-CM

## 2017-11-07 DIAGNOSIS — I10 ESSENTIAL HYPERTENSION: ICD-10-CM

## 2017-11-07 DIAGNOSIS — R07.2 PRECORDIAL PAIN: ICD-10-CM

## 2017-11-07 PROCEDURE — 99214 OFFICE O/P EST MOD 30 MIN: CPT | Performed by: INTERNAL MEDICINE

## 2017-11-07 PROCEDURE — 93000 ELECTROCARDIOGRAM COMPLETE: CPT | Performed by: INTERNAL MEDICINE

## 2017-11-07 NOTE — PROGRESS NOTES
Subjective:     Encounter Date:11/07/2017      Patient ID: Isadora Powell is a 89 y.o. female.    Chief Complaint:  Chest Pain    This is a chronic problem. The current episode started more than 1 year ago. The problem occurs intermittently. The problem has been unchanged. The pain is mild. Associated symptoms include palpitations and shortness of breath. Pertinent negatives include no syncope.   Pertinent negatives for past medical history include no hypertension.   Atrial Fibrillation   Presents for follow-up visit. Symptoms include chest pain, palpitations and shortness of breath. Symptoms are negative for hypertension, syncope and tachycardia. The symptoms have been stable. Past medical history includes atrial fibrillation.   Hypertension   This is a chronic problem. The current episode started more than 1 year ago. The problem is controlled. Associated symptoms include chest pain, palpitations and shortness of breath.       89-year-old female who presents today for reevaluation.  Patient continues to have intermittent episodes of chest discomfort.  She said about 6 months ago she required 3 nitroglycerin to relieve the discomfort.  About a month ago she had one nitroglycerin that relieved her discomfort.  She says she is short of breath fatigue and just generally tired.  She has these vague complaints one of the issues is her focus of her vision is poor at times.  She was seen today for reevaluation.    Review of Systems   Cardiovascular: Positive for chest pain and palpitations. Negative for syncope.   Respiratory: Positive for shortness of breath.    All other systems reviewed and are negative.        ECG 12 Lead  Date/Time: 11/7/2017 11:23 AM  Performed by: LEIDY DURHAM  Authorized by: LEIDY DURHAM   Comparison: compared with previous ECG from 6/12/2017  Similar to previous ECG  Rhythm: atrial fibrillation  Other findings: LVH with strain  Clinical impression: abnormal ECG                Objective:     Physical Exam   Constitutional: She is oriented to person, place, and time. She appears well-developed.   HENT:   Head: Normocephalic.   Eyes: Conjunctivae are normal.   Neck: Normal range of motion.   Cardiovascular: Normal rate.  An irregularly irregular rhythm present.   Murmur heard.   Harsh midsystolic murmur is present with a grade of 1/6  at the upper right sternal border  Pulmonary/Chest: Breath sounds normal.   Abdominal: Soft. Bowel sounds are normal.   Musculoskeletal: Normal range of motion. She exhibits no edema.   Neurological: She is alert and oriented to person, place, and time.   Skin: Skin is warm and dry.   Psychiatric: She has a normal mood and affect. Her behavior is normal.   Vitals reviewed.      Lab Review:       Assessment:          Diagnosis Plan   1. Chronic atrial fibrillation  ECG 12 Lead   2. Essential hypertension     3. Precordial pain            Plan:       1.  Chronic A. fib remains stable  2.  Chest discomforts no real change intermittent nitroglycerin which is appropriate.  3.  She is having a lot of vague symptoms.  Sometimes digoxin can be the etiology of these vague symptoms.  I told her to hold her digoxin for about 3 weeks and see if she notices a difference and then resume it.  I told to watch her heart rate showed doesn't .  If there is no change after several weeks that I would put her back on it have her follow-up in one year if she does stop at that I would see her sooner.  4.  Hypertension blood pressures great      Atrial Fibrillation and Atrial Flutter  Assessment  • The patient has permanent atrial fibrillation  • The patient's CHADS2-VASc score is 6  • A [id number] score of 2 or more is considered a high risk for a thromboembolic event  • Rivaroxaban prescribed    Plan  • Continue in atrial fibrillation with rate control  • Continue rivaroxaban for antithrombotic therapy, bleeding issues discussed

## 2018-01-01 ENCOUNTER — HOSPITAL ENCOUNTER (EMERGENCY)
Facility: HOSPITAL | Age: 83
Discharge: HOME | End: 2018-01-09
Attending: EMERGENCY MEDICINE | Admitting: EMERGENCY MEDICINE

## 2018-01-01 ENCOUNTER — APPOINTMENT (OUTPATIENT)
Dept: GENERAL RADIOLOGY | Facility: HOSPITAL | Age: 83
End: 2018-01-01

## 2018-01-01 ENCOUNTER — HOSPITAL ENCOUNTER (INPATIENT)
Facility: HOSPITAL | Age: 83
LOS: 1 days | End: 2018-12-13
Attending: EMERGENCY MEDICINE | Admitting: INTERNAL MEDICINE

## 2018-01-01 ENCOUNTER — LAB REQUISITION (OUTPATIENT)
Dept: LAB | Facility: HOSPITAL | Age: 83
End: 2018-01-01

## 2018-01-01 ENCOUNTER — APPOINTMENT (OUTPATIENT)
Dept: CT IMAGING | Facility: HOSPITAL | Age: 83
End: 2018-01-01

## 2018-01-01 VITALS
RESPIRATION RATE: 18 BRPM | HEIGHT: 59 IN | BODY MASS INDEX: 16.33 KG/M2 | HEART RATE: 120 BPM | WEIGHT: 81 LBS | OXYGEN SATURATION: 71 % | DIASTOLIC BLOOD PRESSURE: 51 MMHG | TEMPERATURE: 97.8 F | SYSTOLIC BLOOD PRESSURE: 88 MMHG

## 2018-01-01 VITALS
WEIGHT: 96 LBS | DIASTOLIC BLOOD PRESSURE: 94 MMHG | RESPIRATION RATE: 16 BRPM | HEIGHT: 60 IN | SYSTOLIC BLOOD PRESSURE: 164 MMHG | TEMPERATURE: 99.5 F | BODY MASS INDEX: 18.85 KG/M2 | HEART RATE: 113 BPM | OXYGEN SATURATION: 94 %

## 2018-01-01 DIAGNOSIS — S32.591A PUBIC RAMUS FRACTURE, RIGHT, CLOSED, INITIAL ENCOUNTER (HCC): ICD-10-CM

## 2018-01-01 DIAGNOSIS — J18.9 PNEUMONIA DUE TO INFECTIOUS ORGANISM, UNSPECIFIED LATERALITY, UNSPECIFIED PART OF LUNG: Primary | ICD-10-CM

## 2018-01-01 DIAGNOSIS — S16.1XXA STRAIN OF NECK MUSCLE, INITIAL ENCOUNTER: ICD-10-CM

## 2018-01-01 DIAGNOSIS — Z00.00 ROUTINE GENERAL MEDICAL EXAMINATION AT A HEALTH CARE FACILITY: ICD-10-CM

## 2018-01-01 DIAGNOSIS — S72.001A CLOSED DISPLACED FRACTURE OF RIGHT FEMORAL NECK (HCC): ICD-10-CM

## 2018-01-01 DIAGNOSIS — S09.90XA INJURY OF HEAD, INITIAL ENCOUNTER: ICD-10-CM

## 2018-01-01 DIAGNOSIS — S00.83XA CONTUSION OF FOREHEAD, INITIAL ENCOUNTER: Primary | ICD-10-CM

## 2018-01-01 LAB
ANION GAP SERPL CALCULATED.3IONS-SCNC: 10.4 MMOL/L
ANION GAP SERPL CALCULATED.3IONS-SCNC: 9.3 MMOL/L
BASOPHILS # BLD AUTO: 0.01 10*3/MM3 (ref 0–0.2)
BASOPHILS # BLD AUTO: 0.02 10*3/MM3 (ref 0–0.2)
BASOPHILS NFR BLD AUTO: 0.1 % (ref 0–1.5)
BASOPHILS NFR BLD AUTO: 0.4 % (ref 0–1.5)
BUN BLD-MCNC: 12 MG/DL (ref 8–23)
BUN BLD-MCNC: 16 MG/DL (ref 8–23)
BUN/CREAT SERPL: 22.6 (ref 7–25)
BUN/CREAT SERPL: 25.8 (ref 7–25)
CALCIUM SPEC-SCNC: 8.7 MG/DL (ref 8.2–9.6)
CALCIUM SPEC-SCNC: 9.4 MG/DL (ref 8.2–9.6)
CHLORIDE SERPL-SCNC: 102 MMOL/L (ref 98–107)
CHLORIDE SERPL-SCNC: 98 MMOL/L (ref 98–107)
CO2 SERPL-SCNC: 29.7 MMOL/L (ref 22–29)
CO2 SERPL-SCNC: 33.6 MMOL/L (ref 22–29)
CREAT BLD-MCNC: 0.53 MG/DL (ref 0.57–1)
CREAT BLD-MCNC: 0.62 MG/DL (ref 0.57–1)
D-LACTATE SERPL-SCNC: 1.7 MMOL/L (ref 0.5–2)
DEPRECATED RDW RBC AUTO: 49.8 FL (ref 37–54)
DEPRECATED RDW RBC AUTO: 51.4 FL (ref 37–54)
DIGOXIN SERPL-MCNC: 0.4 NG/ML (ref 0.6–1.2)
EOSINOPHIL # BLD AUTO: 0 10*3/MM3 (ref 0–0.7)
EOSINOPHIL # BLD AUTO: 0.09 10*3/MM3 (ref 0–0.7)
EOSINOPHIL NFR BLD AUTO: 0 % (ref 0.3–6.2)
EOSINOPHIL NFR BLD AUTO: 1.7 % (ref 0.3–6.2)
ERYTHROCYTE [DISTWIDTH] IN BLOOD BY AUTOMATED COUNT: 14.7 % (ref 11.7–13)
ERYTHROCYTE [DISTWIDTH] IN BLOOD BY AUTOMATED COUNT: 15.2 % (ref 11.7–13)
GFR SERPL CREATININE-BSD FRML MDRD: 107 ML/MIN/1.73
GFR SERPL CREATININE-BSD FRML MDRD: 89 ML/MIN/1.73
GLUCOSE BLD-MCNC: 136 MG/DL (ref 65–99)
GLUCOSE BLD-MCNC: 93 MG/DL (ref 65–99)
HCT VFR BLD AUTO: 34.2 % (ref 35.6–45.5)
HCT VFR BLD AUTO: 34.4 % (ref 35.6–45.5)
HGB BLD-MCNC: 10.3 G/DL (ref 11.9–15.5)
HGB BLD-MCNC: 10.5 G/DL (ref 11.9–15.5)
IMM GRANULOCYTES # BLD: 0 10*3/MM3 (ref 0–0.03)
IMM GRANULOCYTES # BLD: 0.07 10*3/MM3 (ref 0–0.03)
IMM GRANULOCYTES NFR BLD: 0 % (ref 0–0.5)
IMM GRANULOCYTES NFR BLD: 0.6 % (ref 0–0.5)
LYMPHOCYTES # BLD AUTO: 0.51 10*3/MM3 (ref 0.9–4.8)
LYMPHOCYTES # BLD AUTO: 0.91 10*3/MM3 (ref 0.9–4.8)
LYMPHOCYTES NFR BLD AUTO: 17.6 % (ref 19.6–45.3)
LYMPHOCYTES NFR BLD AUTO: 4.3 % (ref 19.6–45.3)
MCH RBC QN AUTO: 28.1 PG (ref 26.9–32)
MCH RBC QN AUTO: 28.2 PG (ref 26.9–32)
MCHC RBC AUTO-ENTMCNC: 30.1 G/DL (ref 32.4–36.3)
MCHC RBC AUTO-ENTMCNC: 30.5 G/DL (ref 32.4–36.3)
MCV RBC AUTO: 92.5 FL (ref 80.5–98.2)
MCV RBC AUTO: 93.2 FL (ref 80.5–98.2)
MONOCYTES # BLD AUTO: 0.42 10*3/MM3 (ref 0.2–1.2)
MONOCYTES # BLD AUTO: 0.49 10*3/MM3 (ref 0.2–1.2)
MONOCYTES NFR BLD AUTO: 4.1 % (ref 5–12)
MONOCYTES NFR BLD AUTO: 8.1 % (ref 5–12)
NEUTROPHILS # BLD AUTO: 10.8 10*3/MM3 (ref 1.9–8.1)
NEUTROPHILS # BLD AUTO: 3.74 10*3/MM3 (ref 1.9–8.1)
NEUTROPHILS NFR BLD AUTO: 72.2 % (ref 42.7–76)
NEUTROPHILS NFR BLD AUTO: 90.9 % (ref 42.7–76)
PLATELET # BLD AUTO: 169 10*3/MM3 (ref 140–500)
PLATELET # BLD AUTO: 181 10*3/MM3 (ref 140–500)
PMV BLD AUTO: 10.8 FL (ref 6–12)
PMV BLD AUTO: 9.9 FL (ref 6–12)
POTASSIUM BLD-SCNC: 3 MMOL/L (ref 3.5–5.2)
POTASSIUM BLD-SCNC: 4.7 MMOL/L (ref 3.5–5.2)
RBC # BLD AUTO: 3.67 10*6/MM3 (ref 3.9–5.2)
RBC # BLD AUTO: 3.72 10*6/MM3 (ref 3.9–5.2)
SODIUM BLD-SCNC: 141 MMOL/L (ref 136–145)
SODIUM BLD-SCNC: 142 MMOL/L (ref 136–145)
WBC NRBC COR # BLD: 11.88 10*3/MM3 (ref 4.5–10.7)
WBC NRBC COR # BLD: 5.18 10*3/MM3 (ref 4.5–10.7)

## 2018-01-01 PROCEDURE — 99283 EMERGENCY DEPT VISIT LOW MDM: CPT

## 2018-01-01 PROCEDURE — 72125 CT NECK SPINE W/O DYE: CPT

## 2018-01-01 PROCEDURE — 85025 COMPLETE CBC W/AUTO DIFF WBC: CPT | Performed by: EMERGENCY MEDICINE

## 2018-01-01 PROCEDURE — 72170 X-RAY EXAM OF PELVIS: CPT

## 2018-01-01 PROCEDURE — 25010000002 MORPHINE (PF) 10 MG/ML SOLUTION: Performed by: INTERNAL MEDICINE

## 2018-01-01 PROCEDURE — 72192 CT PELVIS W/O DYE: CPT

## 2018-01-01 PROCEDURE — 25010000002 AZITHROMYCIN PER 500 MG: Performed by: EMERGENCY MEDICINE

## 2018-01-01 PROCEDURE — 85025 COMPLETE CBC W/AUTO DIFF WBC: CPT

## 2018-01-01 PROCEDURE — 83605 ASSAY OF LACTIC ACID: CPT | Performed by: EMERGENCY MEDICINE

## 2018-01-01 PROCEDURE — 80162 ASSAY OF DIGOXIN TOTAL: CPT | Performed by: EMERGENCY MEDICINE

## 2018-01-01 PROCEDURE — 25010000002 CEFTRIAXONE PER 250 MG: Performed by: EMERGENCY MEDICINE

## 2018-01-01 PROCEDURE — 80048 BASIC METABOLIC PNL TOTAL CA: CPT | Performed by: EMERGENCY MEDICINE

## 2018-01-01 PROCEDURE — 25010000002 FENTANYL CITRATE (PF) 100 MCG/2ML SOLUTION: Performed by: EMERGENCY MEDICINE

## 2018-01-01 PROCEDURE — 72131 CT LUMBAR SPINE W/O DYE: CPT

## 2018-01-01 PROCEDURE — 25010000002 HYDROMORPHONE 1 MG/ML SOLUTION: Performed by: INTERNAL MEDICINE

## 2018-01-01 PROCEDURE — 70450 CT HEAD/BRAIN W/O DYE: CPT

## 2018-01-01 PROCEDURE — 99285 EMERGENCY DEPT VISIT HI MDM: CPT

## 2018-01-01 PROCEDURE — 71250 CT THORAX DX C-: CPT

## 2018-01-01 PROCEDURE — 87040 BLOOD CULTURE FOR BACTERIA: CPT | Performed by: EMERGENCY MEDICINE

## 2018-01-01 PROCEDURE — 80048 BASIC METABOLIC PNL TOTAL CA: CPT

## 2018-01-01 RX ORDER — GLYCOPYRROLATE 0.2 MG/ML
0.2 INJECTION INTRAMUSCULAR; INTRAVENOUS
Status: DISCONTINUED | OUTPATIENT
Start: 2018-01-01 | End: 2018-01-01 | Stop reason: HOSPADM

## 2018-01-01 RX ORDER — SCOLOPAMINE TRANSDERMAL SYSTEM 1 MG/1
1 PATCH, EXTENDED RELEASE TRANSDERMAL
Status: DISCONTINUED | OUTPATIENT
Start: 2018-01-01 | End: 2018-01-01 | Stop reason: HOSPADM

## 2018-01-01 RX ORDER — CEFTRIAXONE SODIUM 1 G/50ML
1 INJECTION, SOLUTION INTRAVENOUS ONCE
Status: COMPLETED | OUTPATIENT
Start: 2018-01-01 | End: 2018-01-01

## 2018-01-01 RX ORDER — GLYCOPYRROLATE 0.2 MG/ML
0.4 INJECTION INTRAMUSCULAR; INTRAVENOUS
Status: DISCONTINUED | OUTPATIENT
Start: 2018-01-01 | End: 2018-01-01 | Stop reason: HOSPADM

## 2018-01-01 RX ORDER — MORPHINE SULFATE 20 MG/ML
5 SOLUTION ORAL
Status: DISCONTINUED | OUTPATIENT
Start: 2018-01-01 | End: 2018-01-01 | Stop reason: HOSPADM

## 2018-01-01 RX ORDER — LORAZEPAM 2 MG/ML
1 INJECTION INTRAMUSCULAR
Status: DISCONTINUED | OUTPATIENT
Start: 2018-01-01 | End: 2018-01-01 | Stop reason: HOSPADM

## 2018-01-01 RX ORDER — ONDANSETRON 4 MG/1
4 TABLET, ORALLY DISINTEGRATING ORAL EVERY 6 HOURS PRN
Status: DISCONTINUED | OUTPATIENT
Start: 2018-01-01 | End: 2018-01-01 | Stop reason: HOSPADM

## 2018-01-01 RX ORDER — SODIUM CHLORIDE 0.9 % (FLUSH) 0.9 %
3 SYRINGE (ML) INJECTION EVERY 12 HOURS SCHEDULED
Status: DISCONTINUED | OUTPATIENT
Start: 2018-01-01 | End: 2018-01-01 | Stop reason: HOSPADM

## 2018-01-01 RX ORDER — MORPHINE SULFATE 20 MG/ML
20 SOLUTION ORAL
Status: DISCONTINUED | OUTPATIENT
Start: 2018-01-01 | End: 2018-01-01 | Stop reason: HOSPADM

## 2018-01-01 RX ORDER — FENTANYL CITRATE 50 UG/ML
25 INJECTION, SOLUTION INTRAMUSCULAR; INTRAVENOUS ONCE
Status: COMPLETED | OUTPATIENT
Start: 2018-01-01 | End: 2018-01-01

## 2018-01-01 RX ORDER — ACETAMINOPHEN 325 MG/1
650 TABLET ORAL EVERY 4 HOURS PRN
Status: DISCONTINUED | OUTPATIENT
Start: 2018-01-01 | End: 2018-01-01 | Stop reason: HOSPADM

## 2018-01-01 RX ORDER — LORAZEPAM 2 MG/ML
0.5 INJECTION INTRAMUSCULAR
Status: DISCONTINUED | OUTPATIENT
Start: 2018-01-01 | End: 2018-01-01 | Stop reason: HOSPADM

## 2018-01-01 RX ORDER — MORPHINE SULFATE 2 MG/ML
4 INJECTION, SOLUTION INTRAMUSCULAR; INTRAVENOUS
Status: DISCONTINUED | OUTPATIENT
Start: 2018-01-01 | End: 2018-01-01 | Stop reason: HOSPADM

## 2018-01-01 RX ORDER — MORPHINE SULFATE 20 MG/ML
10 SOLUTION ORAL
Status: DISCONTINUED | OUTPATIENT
Start: 2018-01-01 | End: 2018-01-01 | Stop reason: HOSPADM

## 2018-01-01 RX ORDER — HYDROMORPHONE HCL 110MG/55ML
1.5 PATIENT CONTROLLED ANALGESIA SYRINGE INTRAVENOUS
Status: DISCONTINUED | OUTPATIENT
Start: 2018-01-01 | End: 2018-01-01 | Stop reason: HOSPADM

## 2018-01-01 RX ORDER — SODIUM CHLORIDE 0.9 % (FLUSH) 0.9 %
10 SYRINGE (ML) INJECTION AS NEEDED
Status: DISCONTINUED | OUTPATIENT
Start: 2018-01-01 | End: 2018-01-01 | Stop reason: HOSPADM

## 2018-01-01 RX ORDER — CHOLECALCIFEROL (VITAMIN D3) 125 MCG
3000 CAPSULE ORAL
COMMUNITY

## 2018-01-01 RX ORDER — LORAZEPAM 2 MG/ML
0.5 CONCENTRATE ORAL
Status: DISCONTINUED | OUTPATIENT
Start: 2018-01-01 | End: 2018-01-01 | Stop reason: HOSPADM

## 2018-01-01 RX ORDER — ACETAMINOPHEN 650 MG/1
650 SUPPOSITORY RECTAL EVERY 4 HOURS PRN
Status: DISCONTINUED | OUTPATIENT
Start: 2018-01-01 | End: 2018-01-01 | Stop reason: HOSPADM

## 2018-01-01 RX ORDER — LORAZEPAM 2 MG/ML
2 CONCENTRATE ORAL
Status: DISCONTINUED | OUTPATIENT
Start: 2018-01-01 | End: 2018-01-01 | Stop reason: HOSPADM

## 2018-01-01 RX ORDER — SODIUM CHLORIDE 9 MG/ML
100 INJECTION, SOLUTION INTRAVENOUS CONTINUOUS
Status: DISCONTINUED | OUTPATIENT
Start: 2018-01-01 | End: 2018-01-01

## 2018-01-01 RX ORDER — LOPERAMIDE HYDROCHLORIDE 2 MG/1
2 CAPSULE ORAL DAILY PRN
COMMUNITY

## 2018-01-01 RX ORDER — ACETAMINOPHEN 160 MG/5ML
650 SOLUTION ORAL EVERY 4 HOURS PRN
Status: DISCONTINUED | OUTPATIENT
Start: 2018-01-01 | End: 2018-01-01 | Stop reason: HOSPADM

## 2018-01-01 RX ORDER — MORPHINE SULFATE 2 MG/ML
2 INJECTION, SOLUTION INTRAMUSCULAR; INTRAVENOUS
Status: DISCONTINUED | OUTPATIENT
Start: 2018-01-01 | End: 2018-01-01 | Stop reason: HOSPADM

## 2018-01-01 RX ORDER — ONDANSETRON 2 MG/ML
4 INJECTION INTRAMUSCULAR; INTRAVENOUS EVERY 6 HOURS PRN
Status: DISCONTINUED | OUTPATIENT
Start: 2018-01-01 | End: 2018-01-01 | Stop reason: HOSPADM

## 2018-01-01 RX ORDER — ONDANSETRON 4 MG/1
4 TABLET, FILM COATED ORAL EVERY 6 HOURS PRN
Status: DISCONTINUED | OUTPATIENT
Start: 2018-01-01 | End: 2018-01-01 | Stop reason: HOSPADM

## 2018-01-01 RX ORDER — SODIUM CHLORIDE 0.9 % (FLUSH) 0.9 %
1-10 SYRINGE (ML) INJECTION AS NEEDED
Status: DISCONTINUED | OUTPATIENT
Start: 2018-01-01 | End: 2018-01-01 | Stop reason: HOSPADM

## 2018-01-01 RX ORDER — MORPHINE SULFATE 10 MG/ML
6 INJECTION INTRAMUSCULAR; INTRAVENOUS; SUBCUTANEOUS
Status: DISCONTINUED | OUTPATIENT
Start: 2018-01-01 | End: 2018-01-01 | Stop reason: HOSPADM

## 2018-01-01 RX ORDER — ISOSORBIDE DINITRATE 30 MG/1
30 TABLET ORAL 4 TIMES DAILY
COMMUNITY
End: 2018-01-01

## 2018-01-01 RX ORDER — LORAZEPAM 2 MG/ML
1 CONCENTRATE ORAL
Status: DISCONTINUED | OUTPATIENT
Start: 2018-01-01 | End: 2018-01-01 | Stop reason: HOSPADM

## 2018-01-01 RX ORDER — ISOSORBIDE MONONITRATE 30 MG/1
30 TABLET, EXTENDED RELEASE ORAL DAILY
COMMUNITY

## 2018-01-01 RX ORDER — LORAZEPAM 2 MG/ML
2 INJECTION INTRAMUSCULAR
Status: DISCONTINUED | OUTPATIENT
Start: 2018-01-01 | End: 2018-01-01 | Stop reason: HOSPADM

## 2018-01-01 RX ORDER — DIGOXIN 125 MCG
125 TABLET ORAL
COMMUNITY
End: 2018-01-01 | Stop reason: SDUPTHER

## 2018-01-01 RX ORDER — DIPHENOXYLATE HYDROCHLORIDE AND ATROPINE SULFATE 2.5; .025 MG/1; MG/1
1 TABLET ORAL
Status: DISCONTINUED | OUTPATIENT
Start: 2018-01-01 | End: 2018-01-01 | Stop reason: HOSPADM

## 2018-01-01 RX ADMIN — HYDROMORPHONE HYDROCHLORIDE 1 MG: 1 INJECTION, SOLUTION INTRAMUSCULAR; INTRAVENOUS; SUBCUTANEOUS at 00:46

## 2018-01-01 RX ADMIN — AZITHROMYCIN MONOHYDRATE 500 MG: 500 INJECTION, POWDER, LYOPHILIZED, FOR SOLUTION INTRAVENOUS at 22:40

## 2018-01-01 RX ADMIN — MORPHINE SULFATE 4 MG: 10 INJECTION INTRAVENOUS at 18:14

## 2018-01-01 RX ADMIN — SODIUM CHLORIDE 100 ML/HR: 9 INJECTION, SOLUTION INTRAVENOUS at 23:47

## 2018-01-01 RX ADMIN — HYDROMORPHONE HYDROCHLORIDE 1 MG: 1 INJECTION, SOLUTION INTRAMUSCULAR; INTRAVENOUS; SUBCUTANEOUS at 04:54

## 2018-01-01 RX ADMIN — MORPHINE SULFATE 2 MG: 10 INJECTION INTRAVENOUS at 06:30

## 2018-01-01 RX ADMIN — SODIUM CHLORIDE, PRESERVATIVE FREE 3 ML: 5 INJECTION INTRAVENOUS at 00:39

## 2018-01-01 RX ADMIN — FENTANYL CITRATE 25 MCG: 50 INJECTION, SOLUTION INTRAMUSCULAR; INTRAVENOUS at 19:51

## 2018-01-01 RX ADMIN — MORPHINE SULFATE 2 MG: 10 INJECTION INTRAVENOUS at 00:49

## 2018-01-01 RX ADMIN — CEFTRIAXONE SODIUM 1 G: 1 INJECTION, SOLUTION INTRAVENOUS at 21:12

## 2018-01-01 RX ADMIN — MORPHINE SULFATE 2 MG: 10 INJECTION INTRAVENOUS at 11:30

## 2018-01-01 RX ADMIN — SODIUM CHLORIDE, PRESERVATIVE FREE 3 ML: 5 INJECTION INTRAVENOUS at 08:49

## 2018-01-01 RX ADMIN — MORPHINE SULFATE 4 MG: 10 INJECTION INTRAVENOUS at 09:00

## 2018-01-01 RX ADMIN — MORPHINE SULFATE 4 MG: 10 INJECTION INTRAVENOUS at 20:08

## 2018-01-01 RX ADMIN — GLYCOPYRROLATE 0.4 MG: 0.2 INJECTION, SOLUTION INTRAMUSCULAR; INTRAVENOUS at 00:47

## 2018-01-01 RX ADMIN — MORPHINE SULFATE 4 MG: 10 INJECTION INTRAVENOUS at 14:18

## 2018-01-01 RX ADMIN — FENTANYL CITRATE 25 MCG: 50 INJECTION, SOLUTION INTRAMUSCULAR; INTRAVENOUS at 20:55

## 2018-01-01 RX ADMIN — GLYCOPYRROLATE 0.4 MG: 0.2 INJECTION, SOLUTION INTRAMUSCULAR; INTRAVENOUS at 04:53

## 2018-01-01 RX ADMIN — MORPHINE SULFATE 2 MG: 10 INJECTION INTRAVENOUS at 13:08

## 2018-01-09 NOTE — ED NOTES
Pt fell today after she bent over to  a piece of paper. Pt did hit her head on the floor and is on a blood thinner. Pt noted to have hematoma to right upper eyebrow. Pt denies pain, but EMS placed pt roverto c-collar as a precaution. Pt's v/s are stable, pt is a&o and demonstrates no n/v, visual disturbances or any other stroke-like symptom.     Ekaterina Bowles RN  01/09/18 [id number]

## 2018-01-10 NOTE — ED PROVIDER NOTES
" EMERGENCY DEPARTMENT ENCOUNTER    CHIEF COMPLAINT  Chief Complaint: Fall  History given by: Patient, Family  History limited by: N/A  Room Number: 51/51  PMD: JANETTE Kaur      HPI:  Pt presents to the ED c/o head injury sustained earlier today s/p fall in which pt lost her balance while pt was reaching down to grab a puzzle piece off of the floor. Pt denies headache, LOC, abdominal pain, chest pain, dyspnea, nausea, vomiting, and neck pain. Pt reports that she is currently on Xarelto due to h/o A-Fib. There are no other complaints at this time.     Location: Head   Radiation: None  Quality: \"injury\"  Intensity/Severity: Mild  Duration: Fall occurred earlier today  Onset quality: Fall was abrupt  Timing: Injury is constant  Progression: Unchanged  Aggravating Factors: Nothing  Alleviating Factors: Nothing  Previous Episodes: None  Treatment before arrival: C-Collar applied to pt's neck by paramedics   Associated Symptoms: None      PAST MEDICAL HISTORY  Active Ambulatory Problems     Diagnosis Date Noted   • HTN (hypertension) 05/27/2016   • CVA (cerebral infarction) 05/28/2016   • GERD (gastroesophageal reflux disease) 05/28/2016   • GERD (gastroesophageal reflux disease) 05/28/2016   • Atrial fibrillation 05/28/2016   • Depression 05/28/2016   • Acute midline low back pain without sciatica 04/14/2017   • Enteritis 04/14/2017   • Compression fracture of lumbar vertebra 04/14/2017     Resolved Ambulatory Problems     Diagnosis Date Noted   • No Resolved Ambulatory Problems     Past Medical History:   Diagnosis Date   • Acute midline low back pain without sciatica    • Atrial fibrillation    • Compression fracture of lumbar vertebra    • CVA (cerebral infarction)    • Decreased appetite    • Depression    • Difficulty with speech    • Enteritis    • Frontal headache    • GERD (gastroesophageal reflux disease)    • Hypertension    • Hyponatremia    • IBS (irritable bowel syndrome)    • Rib fracture    • " Skin cancer    • TIA (transient ischemic attack)          PAST SURGICAL HISTORY  Past Surgical History:   Procedure Laterality Date   • CATARACT EXTRACTION, BILATERAL  unknown   • CHOLECYSTECTOMY  unknown         FAMILY HISTORY  History reviewed. No pertinent family history.      SOCIAL HISTORY  Social History     Social History   • Marital status:      Spouse name: N/A   • Number of children: N/A   • Years of education: N/A     Occupational History   • Not on file.     Social History Main Topics   • Smoking status: Former Smoker     Packs/day: 0.25     Years: 15.00     Types: Cigarettes     Start date: 1940     Quit date: 1955   • Smokeless tobacco: Never Used   • Alcohol use No   • Drug use: No   • Sexual activity: Defer     Other Topics Concern   • Not on file     Social History Narrative         ALLERGIES  Miconazole and Monistat [tioconazole]        REVIEW OF SYSTEMS  Review of Systems   Constitutional: Negative.    Eyes: Negative for visual disturbance.   Respiratory: Negative for shortness of breath.    Cardiovascular: Negative for chest pain.   Gastrointestinal: Negative for abdominal pain, nausea and vomiting.   Musculoskeletal: Negative for neck pain.        Head injury   Neurological: Negative for syncope, weakness, numbness and headaches.   Psychiatric/Behavioral: Negative.    All other systems reviewed and are negative.           PHYSICAL EXAM  ED Triage Vitals   Temp Heart Rate Resp BP SpO2   01/09/18 1711 01/09/18 1711 01/09/18 1711 01/09/18 1711 01/09/18 1711   99.5 °F (37.5 °C) 86 15 137/71 95 %      Temp src Heart Rate Source Patient Position BP Location FiO2 (%)   01/09/18 1711 -- -- -- --   Oral           Physical Exam   Constitutional: She is oriented to person, place, and time. No distress.   Eyes: EOM are normal.   Neck: Neck supple.   Pt has a C-Collar in place.    Cardiovascular: Normal rate and regular rhythm.    Pulmonary/Chest: Effort normal and breath sounds normal. No respiratory  distress.   Neurological: She is alert and oriented to person, place, and time. She has normal sensation and normal strength.   Skin: Skin is warm.   Hematoma to the right frontal scalp   Psychiatric: Affect normal.   Nursing note and vitals reviewed.            RADIOLOGY  CT Head Without Contrast   Final Result    There is prominent diffuse atrophy and chronic small vessel  ischemic change. This is similar to the study of 2017. There is a  localized scalp hematoma in the right frontal region but no underlying  fracture is seen and the visualized sinuses are clear.    Interpreted by radiologist. Independently viewed by me.         CT Cervical Spine Without Contrast   Final Result    The CT scan was performed as an emergency procedure through  the cervical spine and demonstrates the followin. There is moderate motion artifact which limits bony detail. No  definite fracture is identified with particular reference to the  odontoid.  2. There are scattered degenerative changes throughout the cervical  spine, including spurring of the lateral facets and resulting in some  bony foraminal encroachment, particularly at C3-4 in the left and at  C4-5 bilaterally.    Interpreted by radiologist. Independently viewed by me.                Ordered the above noted radiological studies. Reviewed by me in PACS.       PROCEDURES  Procedures            PROGRESS AND CONSULTS  ED Course   Comment By Time   8:19 PM  Patient with fall and head injury.  Hematoma to head.  Head and C spine CT negative.  Will discharge home.  Discussed concept of subacute bleeding and she is to return for worsening symptoms. Dionicio Ayala MD 2020     7:51 PM:  Informed pt and family that pt's CT Head and CT C-Spine are both negative acute; however, there is a scalp hematoma in the right frontal region visualized. Pt's C-Collar was removed. Pt was advised to take tylenol for pain control. Pt was instructed to hold her Xarelto tonight and  to continue taking it tomorrow. Pt was advised to f/u with PCP. RTER warnings given. Pt and family agree with plan for discharge.         MEDICAL DECISION MAKING      MDM  Number of Diagnoses or Management Options     Amount and/or Complexity of Data Reviewed  Tests in the radiology section of CPT®: ordered and reviewed (CT Head:  There is prominent diffuse atrophy and chronic small vessel ischemic change. This is similar to the study of 06/12/2017. There is a localized scalp hematoma in the right frontal region but no underlying fracture is seen and the visualized sinuses are clear.)    Patient Progress  Patient progress: stable             DIAGNOSIS  Final diagnoses:   Contusion of forehead, initial encounter   Injury of head, initial encounter   Strain of neck muscle, initial encounter         DISPOSITION  Pt discharged.      DISCHARGE    Patient discharged in stable condition.    Reviewed implications of results, diagnosis, meds, responsibility to follow up, warning signs and symptoms of possible worsening, potential complications and reasons to return to ER.    Patient/Family voiced understanding of above instructions.    Discussed plan for discharge, as there is no emergent indication for admission.  Pt/family is agreeable and understands need for follow up and repeat testing.  Pt is aware that discharge does not mean that nothing is wrong but it indicates no emergency is present that requires admission and they must continue care with follow-up as given below or physician of their choice.     FOLLOW-UP  Hoa Kerr, APRN  [id number] [id number] [phone]    Schedule an appointment as soon as possible for a visit            Latest Documented Vital Signs:  As of 10:27 PM  BP- 164/94 HR- 113 Temp- 99.5 °F (37.5 °C) (Oral) O2 sat- 94%        --  Documentation assistance provided by naldo Ross for Dr. Jamie MD.  Information recorded by the naldo was done at  my direction and has been verified and validated by me.           Evelin Ross  01/09/18 2232       Dionicio Ayala MD  01/09/18 [id number]

## 2018-12-11 PROBLEM — S72.009A HIP FRACTURE (HCC): Status: ACTIVE | Noted: 2018-01-01

## 2018-12-11 PROBLEM — J18.9 PNEUMONIA DUE TO INFECTIOUS ORGANISM: Status: ACTIVE | Noted: 2018-01-01

## 2018-12-12 PROBLEM — Z66 DNR (DO NOT RESUSCITATE): Status: ACTIVE | Noted: 2018-01-01

## 2018-12-12 PROBLEM — E43 SEVERE MALNUTRITION (HCC): Status: ACTIVE | Noted: 2018-01-01

## 2018-12-16 LAB
BACTERIA SPEC AEROBE CULT: NORMAL
BACTERIA SPEC AEROBE CULT: NORMAL

## 2020-03-01 NOTE — ED NOTES
"Pt continues to be in obvious pain, states her back is \"spasming\", but also continues to adamantly refuse to allow this RN to administer pain medication as ordered for relief.  Several attempts by this RN and [facility] RN, to  pt about pain relief, to no avail.  Attempted to position pt for comfort several times without improvement.  Notified Dr. Cho of [facility].  PHILIP Snyder, notified as well.     Dora Henry RN  04/13/17 1513    "
Pt called out c/o continued pain.  Assumed pt now wanted ordered pain meds, but pt continued to decline pain medication, requesting to be repositioned for comfort instead.     Dora Henry RN  04/13/17 1592    
Pt reports low back spasms, & low abd pain that began this afternoon.      Cindy Carlos RN  04/13/17 2668    
Report given and care transferred to IFEANYI Akhtar, IFEANYI  04/14/17 0255    
normal...